# Patient Record
Sex: FEMALE | Race: WHITE | NOT HISPANIC OR LATINO | URBAN - METROPOLITAN AREA
[De-identification: names, ages, dates, MRNs, and addresses within clinical notes are randomized per-mention and may not be internally consistent; named-entity substitution may affect disease eponyms.]

---

## 2017-05-05 ENCOUNTER — EMERGENCY (EMERGENCY)
Facility: HOSPITAL | Age: 57
LOS: 1 days | Discharge: HOME | End: 2017-05-05
Attending: EMERGENCY MEDICINE | Admitting: EMERGENCY MEDICINE
Payer: MEDICARE

## 2017-05-05 VITALS
OXYGEN SATURATION: 98 % | SYSTOLIC BLOOD PRESSURE: 146 MMHG | TEMPERATURE: 100 F | DIASTOLIC BLOOD PRESSURE: 74 MMHG | HEART RATE: 85 BPM | RESPIRATION RATE: 18 BRPM

## 2017-05-05 DIAGNOSIS — L03.115 CELLULITIS OF RIGHT LOWER LIMB: ICD-10-CM

## 2017-05-05 DIAGNOSIS — Z88.5 ALLERGY STATUS TO NARCOTIC AGENT: ICD-10-CM

## 2017-05-05 DIAGNOSIS — Z96.651 PRESENCE OF RIGHT ARTIFICIAL KNEE JOINT: ICD-10-CM

## 2017-05-05 DIAGNOSIS — Z96.652 PRESENCE OF LEFT ARTIFICIAL KNEE JOINT: ICD-10-CM

## 2017-05-05 DIAGNOSIS — M25.561 PAIN IN RIGHT KNEE: ICD-10-CM

## 2017-05-05 DIAGNOSIS — Z79.82 LONG TERM (CURRENT) USE OF ASPIRIN: ICD-10-CM

## 2017-05-05 DIAGNOSIS — Z96.653 PRESENCE OF ARTIFICIAL KNEE JOINT, BILATERAL: Chronic | ICD-10-CM

## 2017-05-05 DIAGNOSIS — Z79.899 OTHER LONG TERM (CURRENT) DRUG THERAPY: ICD-10-CM

## 2017-05-05 DIAGNOSIS — Z79.2 LONG TERM (CURRENT) USE OF ANTIBIOTICS: ICD-10-CM

## 2017-05-05 LAB
ANION GAP SERPL CALC-SCNC: 11 MMOL/L — SIGNIFICANT CHANGE UP (ref 5–17)
BASE EXCESS BLDV CALC-SCNC: 5.8 MMOL/L — HIGH (ref -2–2)
BASOPHILS # BLD AUTO: 0 K/UL — SIGNIFICANT CHANGE UP (ref 0–0.2)
BASOPHILS NFR BLD AUTO: 0.2 % — SIGNIFICANT CHANGE UP (ref 0–2)
BUN SERPL-MCNC: 17 MG/DL — SIGNIFICANT CHANGE UP (ref 7–23)
CA-I SERPL-SCNC: 1.16 MMOL/L — SIGNIFICANT CHANGE UP (ref 1.12–1.3)
CALCIUM SERPL-MCNC: 8.9 MG/DL — SIGNIFICANT CHANGE UP (ref 8.4–10.5)
CHLORIDE BLDV-SCNC: 105 MMOL/L — SIGNIFICANT CHANGE UP (ref 96–108)
CHLORIDE SERPL-SCNC: 103 MMOL/L — SIGNIFICANT CHANGE UP (ref 96–108)
CO2 BLDV-SCNC: 33 MMOL/L — HIGH (ref 22–30)
CO2 SERPL-SCNC: 28 MMOL/L — SIGNIFICANT CHANGE UP (ref 22–31)
CREAT SERPL-MCNC: 0.63 MG/DL — SIGNIFICANT CHANGE UP (ref 0.5–1.3)
EOSINOPHIL # BLD AUTO: 0 K/UL — SIGNIFICANT CHANGE UP (ref 0–0.5)
EOSINOPHIL NFR BLD AUTO: 0.5 % — SIGNIFICANT CHANGE UP (ref 0–6)
ERYTHROCYTE [SEDIMENTATION RATE] IN BLOOD: 80 MM/HR — HIGH (ref 0–20)
GAS PNL BLDV: 139 MMOL/L — SIGNIFICANT CHANGE UP (ref 136–145)
GAS PNL BLDV: SIGNIFICANT CHANGE UP
GAS PNL BLDV: SIGNIFICANT CHANGE UP
GLUCOSE BLDV-MCNC: 128 MG/DL — HIGH (ref 70–99)
GLUCOSE SERPL-MCNC: 135 MG/DL — HIGH (ref 70–99)
HCO3 BLDV-SCNC: 31 MMOL/L — HIGH (ref 21–29)
HCT VFR BLD CALC: 39 % — SIGNIFICANT CHANGE UP (ref 34.5–45)
HCT VFR BLDA CALC: 42 % — SIGNIFICANT CHANGE UP (ref 39–50)
HGB BLD CALC-MCNC: 13.6 G/DL — SIGNIFICANT CHANGE UP (ref 11.5–15.5)
HGB BLD-MCNC: 13.3 G/DL — SIGNIFICANT CHANGE UP (ref 11.5–15.5)
LACTATE BLDV-MCNC: 0.9 MMOL/L — SIGNIFICANT CHANGE UP (ref 0.7–2)
LYMPHOCYTES # BLD AUTO: 1 K/UL — SIGNIFICANT CHANGE UP (ref 1–3.3)
LYMPHOCYTES # BLD AUTO: 14.4 % — SIGNIFICANT CHANGE UP (ref 13–44)
MCHC RBC-ENTMCNC: 33.5 PG — SIGNIFICANT CHANGE UP (ref 27–34)
MCHC RBC-ENTMCNC: 34.2 GM/DL — SIGNIFICANT CHANGE UP (ref 32–36)
MCV RBC AUTO: 98 FL — SIGNIFICANT CHANGE UP (ref 80–100)
MONOCYTES # BLD AUTO: 0.7 K/UL — SIGNIFICANT CHANGE UP (ref 0–0.9)
MONOCYTES NFR BLD AUTO: 10.6 % — SIGNIFICANT CHANGE UP (ref 2–14)
NEUTROPHILS # BLD AUTO: 5.2 K/UL — SIGNIFICANT CHANGE UP (ref 1.8–7.4)
NEUTROPHILS NFR BLD AUTO: 74.3 % — SIGNIFICANT CHANGE UP (ref 43–77)
PCO2 BLDV: 50 MMHG — SIGNIFICANT CHANGE UP (ref 35–50)
PH BLDV: 7.41 — SIGNIFICANT CHANGE UP (ref 7.35–7.45)
PLATELET # BLD AUTO: 249 K/UL — SIGNIFICANT CHANGE UP (ref 150–400)
PO2 BLDV: 31 MMHG — SIGNIFICANT CHANGE UP (ref 25–45)
POTASSIUM BLDV-SCNC: 3.7 MMOL/L — SIGNIFICANT CHANGE UP (ref 3.5–5)
POTASSIUM SERPL-MCNC: 3.9 MMOL/L — SIGNIFICANT CHANGE UP (ref 3.5–5.3)
POTASSIUM SERPL-SCNC: 3.9 MMOL/L — SIGNIFICANT CHANGE UP (ref 3.5–5.3)
RBC # BLD: 3.98 M/UL — SIGNIFICANT CHANGE UP (ref 3.8–5.2)
RBC # FLD: 11.9 % — SIGNIFICANT CHANGE UP (ref 10.3–14.5)
SAO2 % BLDV: 57 % — LOW (ref 67–88)
SODIUM SERPL-SCNC: 142 MMOL/L — SIGNIFICANT CHANGE UP (ref 135–145)
WBC # BLD: 7 K/UL — SIGNIFICANT CHANGE UP (ref 3.8–10.5)
WBC # FLD AUTO: 7 K/UL — SIGNIFICANT CHANGE UP (ref 3.8–10.5)

## 2017-05-05 PROCEDURE — 99218: CPT

## 2017-05-05 PROCEDURE — 73560 X-RAY EXAM OF KNEE 1 OR 2: CPT | Mod: 26,RT

## 2017-05-05 RX ORDER — CEFAZOLIN SODIUM 1 G
1000 VIAL (EA) INJECTION ONCE
Qty: 0 | Refills: 0 | Status: COMPLETED | OUTPATIENT
Start: 2017-05-05 | End: 2017-05-05

## 2017-05-05 RX ORDER — CEFAZOLIN SODIUM 1 G
VIAL (EA) INJECTION
Qty: 0 | Refills: 0 | Status: DISCONTINUED | OUTPATIENT
Start: 2017-05-05 | End: 2017-05-09

## 2017-05-05 RX ORDER — CEFAZOLIN SODIUM 1 G
1000 VIAL (EA) INJECTION EVERY 8 HOURS
Qty: 0 | Refills: 0 | Status: DISCONTINUED | OUTPATIENT
Start: 2017-05-06 | End: 2017-05-09

## 2017-05-05 RX ORDER — IBUPROFEN 200 MG
600 TABLET ORAL EVERY 6 HOURS
Qty: 0 | Refills: 0 | Status: DISCONTINUED | OUTPATIENT
Start: 2017-05-05 | End: 2017-05-09

## 2017-05-05 RX ORDER — IBUPROFEN 200 MG
600 TABLET ORAL ONCE
Qty: 0 | Refills: 0 | Status: COMPLETED | OUTPATIENT
Start: 2017-05-05 | End: 2017-05-05

## 2017-05-05 RX ADMIN — Medication 600 MILLIGRAM(S): at 20:30

## 2017-05-05 RX ADMIN — Medication 600 MILLIGRAM(S): at 21:48

## 2017-05-05 RX ADMIN — Medication 100 MILLIGRAM(S): at 21:47

## 2017-05-05 NOTE — ED PROVIDER NOTE - MEDICAL DECISION MAKING DETAILS
Lauryn (attending)- pt with warmth and erythema over right knee, full range of motion. Most likely cellulitis, less likely septic joint. Will check labs and reassess.

## 2017-05-05 NOTE — ED CDU PROVIDER NOTE - OBJECTIVE STATEMENT
57y/o F with PMH down syndrome, MR, b/l knee replacements (over 5 years ago in [facility]), arthritis, c/o knee pain x 3 days. pain and swelling getting worse. developed redness and warmth as well. states pain is worse with bending her knee. also has R wrist redness x 1 day, h/o wrist cellulitis.  PMD Dr. Ivon Contreras

## 2017-05-05 NOTE — ED PROVIDER NOTE - NS ED MD SCRIBE ATTENDING SCRIBE SECTIONS
VITAL SIGNS( Pullset)/PHYSICAL EXAM/INTAKE ASSESSMENT/SCREENINGS/HIV/HISTORY OF PRESENT ILLNESS/PAST MEDICAL/SURGICAL/SOCIAL HISTORY/REVIEW OF SYSTEMS

## 2017-05-05 NOTE — ED PROVIDER NOTE - MUSCULOSKELETAL, MLM
B/L surgical scars on knees, full active ROM, no effusion. Right wrist with cyst on posterior, full range of motion, no erythema or warmth

## 2017-05-05 NOTE — ED PROVIDER NOTE - OBJECTIVE STATEMENT
55y/o F with PMH down syndrome, MR, b/l knee replacements, arthritis, c/o knee pain x 3 days. pain and swelling getting worse. developed redness and warmth as well. states pain is worse with bending her knee. also has R wrist redness x 1 day, h/o wrist cellulitis.  PMD Dr. Ivon Contreras

## 2017-05-05 NOTE — ED CDU PROVIDER NOTE - PROGRESS NOTE DETAILS
Pts PMD: Dr. Contreras  Pt sleeping. NAD. No complaints. VSS. Continue to monitor. -ZANDER Wiley given motrin for pain. stable cellulitis will cont with current plan -ZANDER Wiley Patient seen and examined at bedside in [facility].  No acute overnight events. Patient reports that she is feeling better.  Sister at bedside reports erythema has improved.  No fevers/chills.  On exam: mild erythema without extension past previously drawn borders.  FROM of the right knee.  -Corey Haley PA-C Dr. Alonso Note: I have personally performed a face to face diagnostic evaluation on this patient.  I have reviewed the ACP note and agree with the history, exam, and plan of care, except as noted.  History and Exam by me shows well appearing, NAD, improved cellulitis of leg, stable for dc home.

## 2017-05-05 NOTE — ED ADULT NURSE REASSESSMENT NOTE - NS ED NURSE REASSESS COMMENT FT1
Received pt from [facility]SC[facility] Dawkins, (midway) received pt alert and responsive, oriented x4, denies any respiratory distress, SOB, or difficulty breathing. Pt transferred to CDU for observation for redness, swelling to R knee and R wrist redness. Pt to receive IV ancef q8h as ordered, IV in place, patent and free of signs of infiltration,  V/S stable, pt afebrile, pt denies pain at this time. Pt and mother of pt educated on unit and unit rules, instructed patient to notify RN of any needed assistance, Pt verbalizes understanding, Call bell placed within reach. Safety maintained. Will continue to monitor.

## 2017-05-05 NOTE — ED CDU PROVIDER NOTE - PLAN OF CARE
1. Follow up with your PMD within 48-72hours.   2. Rest and elevate affected area. Take Motrin 600mg every 8 hours with food for pain. Complete the course of antibiotics as prescribed.   3. Any worsening redness, swelling, streaking (red lines), fever, chills retrun to ER 1. Follow up with your PMD within 48-72hours.   2. Rest and elevate affected area. Take Motrin 600mg every 8 hours with food for pain. Complete the course of antibiotics as prescribed.   3. Any worsening redness, swelling, streaking (red lines), fever, chills return to ER

## 2017-05-05 NOTE — ED CDU PROVIDER NOTE - CONDUCTED A DETAILED DISCUSSION WITH PATIENT AND/OR GUARDIAN REGARDING, MDM
lab results/radiology results radiology results/return to ED if symptoms worsen, persist or questions arise/need for outpatient follow-up/lab results

## 2017-05-05 NOTE — ED ADULT NURSE NOTE - OBJECTIVE STATEMENT
pt to room 19 accompanied by family with c/o r knee reddness warmth for 1day. pt denies trama. pt s/p r knee replacement 5 yearago  for osteoarthritis. pt also with reddness of r wrist. [facility] PT DEVELOPS REDDNESS OF JOINTS.  R KNEE RED WARM TENDER WITHFLEXTION AND EXTENSION. PT ABLE TO WALK REDDNESS ONKNEE AND SIDES. PT DENIES FEVER / CHILLS. SIDE rails up for saftey.

## 2017-05-06 VITALS
SYSTOLIC BLOOD PRESSURE: 134 MMHG | DIASTOLIC BLOOD PRESSURE: 80 MMHG | RESPIRATION RATE: 17 BRPM | OXYGEN SATURATION: 99 % | TEMPERATURE: 99 F | HEART RATE: 69 BPM

## 2017-05-06 LAB — CRP SERPL-MCNC: 16.96 MG/DL — HIGH (ref 0–0.4)

## 2017-05-06 PROCEDURE — 85027 COMPLETE CBC AUTOMATED: CPT

## 2017-05-06 PROCEDURE — 84132 ASSAY OF SERUM POTASSIUM: CPT

## 2017-05-06 PROCEDURE — 99284 EMERGENCY DEPT VISIT MOD MDM: CPT | Mod: 25

## 2017-05-06 PROCEDURE — 82803 BLOOD GASES ANY COMBINATION: CPT

## 2017-05-06 PROCEDURE — 99217: CPT

## 2017-05-06 PROCEDURE — 86140 C-REACTIVE PROTEIN: CPT

## 2017-05-06 PROCEDURE — 83605 ASSAY OF LACTIC ACID: CPT

## 2017-05-06 PROCEDURE — G0378: CPT

## 2017-05-06 PROCEDURE — 96374 THER/PROPH/DIAG INJ IV PUSH: CPT

## 2017-05-06 PROCEDURE — 85014 HEMATOCRIT: CPT

## 2017-05-06 PROCEDURE — 80048 BASIC METABOLIC PNL TOTAL CA: CPT

## 2017-05-06 PROCEDURE — 96376 TX/PRO/DX INJ SAME DRUG ADON: CPT

## 2017-05-06 PROCEDURE — 82435 ASSAY OF BLOOD CHLORIDE: CPT

## 2017-05-06 PROCEDURE — 82947 ASSAY GLUCOSE BLOOD QUANT: CPT

## 2017-05-06 PROCEDURE — 73560 X-RAY EXAM OF KNEE 1 OR 2: CPT

## 2017-05-06 PROCEDURE — 84295 ASSAY OF SERUM SODIUM: CPT

## 2017-05-06 PROCEDURE — 82330 ASSAY OF CALCIUM: CPT

## 2017-05-06 PROCEDURE — 85652 RBC SED RATE AUTOMATED: CPT

## 2017-05-06 RX ORDER — CEPHALEXIN 500 MG
1 CAPSULE ORAL
Qty: 28 | Refills: 0 | OUTPATIENT
Start: 2017-05-06 | End: 2017-05-13

## 2017-05-06 RX ADMIN — Medication 100 MILLIGRAM(S): at 05:21

## 2017-05-06 RX ADMIN — Medication 600 MILLIGRAM(S): at 04:14

## 2017-05-06 RX ADMIN — Medication 100 MILLIGRAM(S): at 12:23

## 2017-05-06 RX ADMIN — Medication 600 MILLIGRAM(S): at 05:17

## 2017-07-27 ENCOUNTER — NON-APPOINTMENT (OUTPATIENT)
Age: 57
End: 2017-07-27

## 2017-07-27 ENCOUNTER — APPOINTMENT (OUTPATIENT)
Dept: CARDIOLOGY | Facility: CLINIC | Age: 57
End: 2017-07-27
Payer: MEDICARE

## 2017-07-27 VITALS
OXYGEN SATURATION: 95 % | HEIGHT: 59 IN | SYSTOLIC BLOOD PRESSURE: 126 MMHG | BODY MASS INDEX: 25.4 KG/M2 | DIASTOLIC BLOOD PRESSURE: 85 MMHG | WEIGHT: 126 LBS | HEART RATE: 82 BPM

## 2017-07-27 PROCEDURE — 93000 ELECTROCARDIOGRAM COMPLETE: CPT

## 2017-07-27 PROCEDURE — 99214 OFFICE O/P EST MOD 30 MIN: CPT

## 2018-01-09 ENCOUNTER — NON-APPOINTMENT (OUTPATIENT)
Age: 58
End: 2018-01-09

## 2018-01-09 ENCOUNTER — APPOINTMENT (OUTPATIENT)
Dept: CARDIOLOGY | Facility: CLINIC | Age: 58
End: 2018-01-09
Payer: MEDICARE

## 2018-01-09 VITALS
BODY MASS INDEX: 24.8 KG/M2 | WEIGHT: 123 LBS | SYSTOLIC BLOOD PRESSURE: 130 MMHG | DIASTOLIC BLOOD PRESSURE: 85 MMHG | HEIGHT: 59 IN | HEART RATE: 84 BPM | OXYGEN SATURATION: 99 %

## 2018-01-09 PROCEDURE — 93000 ELECTROCARDIOGRAM COMPLETE: CPT

## 2018-01-09 PROCEDURE — 99214 OFFICE O/P EST MOD 30 MIN: CPT

## 2018-07-12 ENCOUNTER — NON-APPOINTMENT (OUTPATIENT)
Age: 58
End: 2018-07-12

## 2018-07-12 ENCOUNTER — APPOINTMENT (OUTPATIENT)
Dept: CARDIOLOGY | Facility: CLINIC | Age: 58
End: 2018-07-12
Payer: MEDICARE

## 2018-07-12 VITALS
SYSTOLIC BLOOD PRESSURE: 118 MMHG | DIASTOLIC BLOOD PRESSURE: 70 MMHG | HEART RATE: 64 BPM | HEIGHT: 59 IN | WEIGHT: 123 LBS | OXYGEN SATURATION: 99 % | BODY MASS INDEX: 24.8 KG/M2

## 2018-07-12 PROCEDURE — 99214 OFFICE O/P EST MOD 30 MIN: CPT

## 2018-07-12 PROCEDURE — 93000 ELECTROCARDIOGRAM COMPLETE: CPT

## 2018-07-12 RX ORDER — AMOXICILLIN 500 MG/1
500 TABLET, FILM COATED ORAL
Qty: 8 | Refills: 3 | Status: ACTIVE | COMMUNITY
Start: 2018-07-12

## 2019-01-17 ENCOUNTER — APPOINTMENT (OUTPATIENT)
Dept: CARDIOLOGY | Facility: CLINIC | Age: 59
End: 2019-01-17
Payer: MEDICARE

## 2019-01-17 ENCOUNTER — NON-APPOINTMENT (OUTPATIENT)
Age: 59
End: 2019-01-17

## 2019-01-17 VITALS — DIASTOLIC BLOOD PRESSURE: 80 MMHG | SYSTOLIC BLOOD PRESSURE: 140 MMHG

## 2019-01-17 VITALS
HEART RATE: 60 BPM | HEIGHT: 59 IN | SYSTOLIC BLOOD PRESSURE: 160 MMHG | DIASTOLIC BLOOD PRESSURE: 80 MMHG | BODY MASS INDEX: 25 KG/M2 | OXYGEN SATURATION: 97 % | WEIGHT: 124 LBS

## 2019-01-17 PROCEDURE — 93000 ELECTROCARDIOGRAM COMPLETE: CPT

## 2019-01-17 PROCEDURE — 99214 OFFICE O/P EST MOD 30 MIN: CPT

## 2019-01-17 RX ORDER — FAMOTIDINE 20 MG/1
20 TABLET, FILM COATED ORAL
Refills: 0 | Status: ACTIVE | COMMUNITY

## 2019-01-17 NOTE — DISCUSSION/SUMMARY
[___ Week(s)] : [unfilled] week(s) [FreeTextEntry3] : echo [FreeTextEntry1] : She'll stay on her aspirin 81 mg daily for cardiovascular risk reduction. I ordered an echocardiogram to assess her current LV function and valvular status. Again, I recommended a heart healthy lifestyle including a low-saturated fat, low cholesterol diet with improved aerobic physical fitness over time for cardiovascular benefits. We will call her with echo report and she will follow up with you for care.

## 2019-01-17 NOTE — PHYSICAL EXAM
[General Appearance - Well Developed] : well developed [Well Groomed] : well groomed [General Appearance - Well Nourished] : well nourished [General Appearance - In No Acute Distress] : no acute distress [Normal Conjunctiva] : the conjunctiva exhibited no abnormalities [Eyelids - No Xanthelasma] : the eyelids demonstrated no xanthelasmas [Normal Oral Mucosa] : normal oral mucosa [No Oral Pallor] : no oral pallor [No Oral Cyanosis] : no oral cyanosis [Normal Jugular Venous A Waves Present] : normal jugular venous A waves present [Normal Jugular Venous V Waves Present] : normal jugular venous V waves present [No Jugular Venous Leon A Waves] : no jugular venous leon A waves [Respiration, Rhythm And Depth] : normal respiratory rhythm and effort [Exaggerated Use Of Accessory Muscles For Inspiration] : no accessory muscle use [Auscultation Breath Sounds / Voice Sounds] : lungs were clear to auscultation bilaterally [Bowel Sounds] : normal bowel sounds [Abdomen Soft] : soft [Abdomen Tenderness] : non-tender [Abnormal Walk] : normal gait [Nail Clubbing] : no clubbing of the fingernails [Cyanosis, Localized] : no localized cyanosis [Petechial Hemorrhages (___cm)] : no petechial hemorrhages [Skin Color & Pigmentation] : normal skin color and pigmentation [] : no rash [No Venous Stasis] : no venous stasis [Oriented To Time, Place, And Person] : oriented to person, place, and time [Affect] : the affect was normal [Mood] : the mood was normal [No Anxiety] : not feeling anxious [Not Palpable] : not palpable [No Precordial Heave] : no precordial heave was noted [Normal Rate] : normal [Heart Rate ___] : [unfilled] bpm [Rhythm Regular] : regular [Normal S1] : normal S1 [Normal S2] : normal S2 [No Gallop] : no gallop heard [No Murmur] : no murmurs heard [1+] : left 1+ [2+] : left 2+ [No Abnormalities] : the abdominal aorta was not enlarged and no bruit was heard [___ +] : bilateral [unfilled]U+ pitting edema to the ankles [FreeTextEntry1] : Down's syndrome facies [Apical Thrill] : no thrill palpable at the apex [Click] : no click [Pericardial Rub] : no pericardial rub [Right Carotid Bruit] : no bruit heard over the right carotid [Left Carotid Bruit] : no bruit heard over the left carotid [Bruit] : no bruit heard [Rt] : no varicose veins of the right leg [Lt] : no varicose veins of the left leg

## 2019-01-17 NOTE — REASON FOR VISIT
[Follow-Up - Clinic] : a clinic follow-up of [Abnormal ECG] : an abnormal ECG [Parent] : parent [FreeTextEntry1] : Labile hypertension

## 2019-01-17 NOTE — HISTORY OF PRESENT ILLNESS
[FreeTextEntry1] : Cande is 58 years of age with developmental delay and Down syndrome with labile hypertension without any further symptoms of near-syncope or dizziness. She is ambulatory here today with her mother. No current chest complaints. She is eating generally healthy

## 2019-01-24 ENCOUNTER — APPOINTMENT (OUTPATIENT)
Dept: CARDIOLOGY | Facility: CLINIC | Age: 59
End: 2019-01-24
Payer: MEDICARE

## 2019-01-24 PROCEDURE — 93306 TTE W/DOPPLER COMPLETE: CPT

## 2019-01-25 ENCOUNTER — TRANSCRIPTION ENCOUNTER (OUTPATIENT)
Age: 59
End: 2019-01-25

## 2019-07-18 ENCOUNTER — TRANSCRIPTION ENCOUNTER (OUTPATIENT)
Age: 59
End: 2019-07-18

## 2019-07-18 ENCOUNTER — APPOINTMENT (OUTPATIENT)
Dept: CARDIOLOGY | Facility: CLINIC | Age: 59
End: 2019-07-18
Payer: MEDICARE

## 2019-07-18 ENCOUNTER — NON-APPOINTMENT (OUTPATIENT)
Age: 59
End: 2019-07-18

## 2019-07-18 VITALS
OXYGEN SATURATION: 96 % | BODY MASS INDEX: 25 KG/M2 | WEIGHT: 124 LBS | SYSTOLIC BLOOD PRESSURE: 140 MMHG | HEART RATE: 54 BPM | DIASTOLIC BLOOD PRESSURE: 80 MMHG | HEIGHT: 59 IN

## 2019-07-18 PROCEDURE — 99214 OFFICE O/P EST MOD 30 MIN: CPT

## 2019-07-18 PROCEDURE — 93000 ELECTROCARDIOGRAM COMPLETE: CPT

## 2019-07-18 NOTE — HISTORY OF PRESENT ILLNESS
[FreeTextEntry1] : Cande is 59 years of age with Down syndrome, developmental delay residing in her group home with history of labile hypertension but no active symptoms feeling generally fine. Last echo showed preserved LV systolic function and evidence of mild to moderate mitral insufficiency. I understand she recently had cold guard positive Hemoccult stool and had colonoscopy status post polypectomy. No current chest discomfort. No current chest complaints. She is eating healthy and participating in exercises without lifestyle limiting symptoms.

## 2019-07-18 NOTE — DISCUSSION/SUMMARY
[___ Month(s)] : [unfilled] month(s) [FreeTextEntry1] : She will continue on her background supportive care medications and at this point given her recent clinical history, will stop aspirin in favor of intermittent pain relieving low-dose NSAID use and GI care management. Aspirin has fallen out of favor of primary prevention without compelling comorbid cardiovascular risk factors. She will followup with you for care and see me in about 6 months or sooner if needed. Labs are reviewed. ECG and echo were also reviewed.

## 2020-01-23 ENCOUNTER — APPOINTMENT (OUTPATIENT)
Dept: CARDIOLOGY | Facility: CLINIC | Age: 60
End: 2020-01-23
Payer: MEDICARE

## 2020-01-23 VITALS
BODY MASS INDEX: 24.6 KG/M2 | HEART RATE: 83 BPM | SYSTOLIC BLOOD PRESSURE: 142 MMHG | HEIGHT: 59 IN | OXYGEN SATURATION: 98 % | DIASTOLIC BLOOD PRESSURE: 80 MMHG | WEIGHT: 122 LBS

## 2020-01-23 DIAGNOSIS — R42 DIZZINESS AND GIDDINESS: ICD-10-CM

## 2020-01-23 PROCEDURE — 93000 ELECTROCARDIOGRAM COMPLETE: CPT

## 2020-01-23 PROCEDURE — 99214 OFFICE O/P EST MOD 30 MIN: CPT

## 2020-01-28 ENCOUNTER — OTHER (OUTPATIENT)
Age: 60
End: 2020-01-28

## 2020-02-07 ENCOUNTER — APPOINTMENT (OUTPATIENT)
Dept: ELECTROPHYSIOLOGY | Facility: CLINIC | Age: 60
End: 2020-02-07
Payer: MEDICARE

## 2020-02-07 VITALS
SYSTOLIC BLOOD PRESSURE: 164 MMHG | WEIGHT: 122 LBS | OXYGEN SATURATION: 96 % | DIASTOLIC BLOOD PRESSURE: 97 MMHG | HEIGHT: 59 IN | RESPIRATION RATE: 16 BRPM | BODY MASS INDEX: 24.6 KG/M2 | HEART RATE: 70 BPM

## 2020-02-07 DIAGNOSIS — R55 SYNCOPE AND COLLAPSE: ICD-10-CM

## 2020-02-07 PROCEDURE — 93000 ELECTROCARDIOGRAM COMPLETE: CPT

## 2020-02-07 PROCEDURE — 99203 OFFICE O/P NEW LOW 30 MIN: CPT

## 2020-02-07 NOTE — PHYSICAL EXAM
[Normal Appearance] : normal appearance [General Appearance - Well Developed] : well developed [General Appearance - Well Nourished] : well nourished [Well Groomed] : well groomed [General Appearance - In No Acute Distress] : no acute distress [No Deformities] : no deformities [Normal Conjunctiva] : the conjunctiva exhibited no abnormalities [Eyelids - No Xanthelasma] : the eyelids demonstrated no xanthelasmas [Normal Oral Mucosa] : normal oral mucosa [No Oral Pallor] : no oral pallor [No Oral Cyanosis] : no oral cyanosis [Normal Jugular Venous A Waves Present] : normal jugular venous A waves present [Normal Jugular Venous V Waves Present] : normal jugular venous V waves present [No Jugular Venous Leon A Waves] : no jugular venous leon A waves [Heart Rate And Rhythm] : heart rate and rhythm were normal [Heart Sounds] : normal S1 and S2 [Murmurs] : no murmurs present [Auscultation Breath Sounds / Voice Sounds] : lungs were clear to auscultation bilaterally [Exaggerated Use Of Accessory Muscles For Inspiration] : no accessory muscle use [Respiration, Rhythm And Depth] : normal respiratory rhythm and effort [Abdomen Tenderness] : non-tender [Abdomen Soft] : soft [Abdomen Mass (___ Cm)] : no abdominal mass palpated [Abnormal Walk] : normal gait [Gait - Sufficient For Exercise Testing] : the gait was sufficient for exercise testing [Nail Clubbing] : no clubbing of the fingernails [Cyanosis, Localized] : no localized cyanosis [Petechial Hemorrhages (___cm)] : no petechial hemorrhages [Skin Color & Pigmentation] : normal skin color and pigmentation [] : no ischemic changes [Skin Lesions] : no skin lesions [No Venous Stasis] : no venous stasis [No Skin Ulcers] : no skin ulcer [No Xanthoma] : no  xanthoma was observed [Affect] : the affect was normal [Oriented To Time, Place, And Person] : oriented to person, place, and time [Mood] : the mood was normal [No Anxiety] : not feeling anxious

## 2020-02-08 ENCOUNTER — NON-APPOINTMENT (OUTPATIENT)
Age: 60
End: 2020-02-08

## 2020-02-08 NOTE — DISCUSSION/SUMMARY
[FreeTextEntry1] : Cande Grossman is a 60y/o woman with Hx of Downs Syndrome, lives in a group home, labile HTN, hypothyroid, GERD, mild-moderate mitral insufficiency, and recurring dizziness/near syncope who presents today for initial evaluation.\par \par Impression:\par \par 1. Dizziness/vaso vagal near syncope: EKG today NSR. Given prodromal symptoms of dizziness/heat/pallor, likely vasovagal. Positive tilt test in 2016. Instructed on safety mechanisms such as staying well hydrated, utilizing salt, avoiding prolonged standing with knees locked, and to lie down with feet elevated if symptoms of near syncope occur. Can also consider use of compression socks and should avoid known triggers for syncope such as alcohol and warm or crowded environments. Given noted bradycardia in the 40s, possible cardioinhibitory response. Consider ILR placement for long term monitoring of cardioinhibitory syncope. Risks, benefits, and alternatives discussed. \par \par 2. Hypothyroid: resume levothyroxine as prescribed and regular f/u with PCP for routine TFT monitoring and management.\par \par 3. HTN: resume BP monitoring and keeping a log. Encouraged heart healthy diet, sodium restriction, and weight loss. Continue regular f/u with Cardiologist for further HTN management.\par \par Will continue f/u with Cardiologist and may RTO as needed or if any new or worsening symptoms or findings occur.\par \par Sincerely,\par \par Timothy Quintana MD

## 2020-02-08 NOTE — HISTORY OF PRESENT ILLNESS
[FreeTextEntry1] : Oj Arias MD\par \par Cande Grossman is a 60y/o woman with Hx of Downs Syndrome, lives in a group home, labile HTN, hypothyroid, GERD, mild-moderate mitral insufficiency, and recurring dizziness/near syncope who presents today for initial evaluation. Has long standing history of dizziness, typically in the morning with symptoms of heat and noted pallor. Was seen by EP and underwent tilt table test which was positive (2016). Since that time, she had no further episodes until recently. Most recent, had an episode of dizziness in the evening with sensation of her eyes quivering. Noted feeling hot at that time as well. No syncope or LOC. Her BP was normal at the time but her heart rate was in the 40s during that time. This is similar to event sin the past where her heart rate was transiently in the 40s during symptoms. Has had no syncope or near syncope. Was started on low dose bisoprolol which has been held in the setting of bradycardia at times. No dizziness with use but only took two days before noting bradycardia. Prior episodes without use of AV nodals. Has known labile BP. Vital signs are monitored by Group Home closely. Denies chest pain, palpitations, or SOB.\par

## 2020-02-27 ENCOUNTER — APPOINTMENT (OUTPATIENT)
Dept: CARDIOLOGY | Facility: CLINIC | Age: 60
End: 2020-02-27
Payer: MEDICARE

## 2020-02-27 VITALS
BODY MASS INDEX: 24.8 KG/M2 | WEIGHT: 123 LBS | HEART RATE: 80 BPM | DIASTOLIC BLOOD PRESSURE: 80 MMHG | SYSTOLIC BLOOD PRESSURE: 140 MMHG | OXYGEN SATURATION: 98 % | HEIGHT: 59 IN

## 2020-02-27 PROCEDURE — 99214 OFFICE O/P EST MOD 30 MIN: CPT

## 2020-02-27 RX ORDER — BISOPROLOL FUMARATE 5 MG/1
5 TABLET, FILM COATED ORAL
Qty: 60 | Refills: 0 | Status: DISCONTINUED | COMMUNITY
Start: 2020-01-23 | End: 2020-02-27

## 2020-02-27 NOTE — HISTORY OF PRESENT ILLNESS
[FreeTextEntry1] : Cande is a pleasant 59-year-old female with developmental delay, Down syndrome, labile hypertension and mild to moderate mitral regurgitation following up in the office. I reviewed recent data from EP evaluation advising conservative care management. Intolerance to bisoprolol noted as led to further bradycardia.

## 2020-02-27 NOTE — PHYSICAL EXAM
[General Appearance - Well Developed] : well developed [Well Groomed] : well groomed [General Appearance - Well Nourished] : well nourished [General Appearance - In No Acute Distress] : no acute distress [Normal Conjunctiva] : the conjunctiva exhibited no abnormalities [Eyelids - No Xanthelasma] : the eyelids demonstrated no xanthelasmas [Normal Oral Mucosa] : normal oral mucosa [No Oral Pallor] : no oral pallor [No Oral Cyanosis] : no oral cyanosis [Normal Jugular Venous A Waves Present] : normal jugular venous A waves present [Normal Jugular Venous V Waves Present] : normal jugular venous V waves present [No Jugular Venous Leon A Waves] : no jugular venous leon A waves [Respiration, Rhythm And Depth] : normal respiratory rhythm and effort [Exaggerated Use Of Accessory Muscles For Inspiration] : no accessory muscle use [Bowel Sounds] : normal bowel sounds [Auscultation Breath Sounds / Voice Sounds] : lungs were clear to auscultation bilaterally [Abdomen Tenderness] : non-tender [Abdomen Soft] : soft [Abnormal Walk] : normal gait [Nail Clubbing] : no clubbing of the fingernails [Cyanosis, Localized] : no localized cyanosis [Petechial Hemorrhages (___cm)] : no petechial hemorrhages [Skin Color & Pigmentation] : normal skin color and pigmentation [No Venous Stasis] : no venous stasis [] : no rash [Oriented To Time, Place, And Person] : oriented to person, place, and time [Affect] : the affect was normal [Mood] : the mood was normal [No Anxiety] : not feeling anxious [Not Palpable] : not palpable [No Precordial Heave] : no precordial heave was noted [Normal Rate] : normal [Heart Rate ___] : [unfilled] bpm [Rhythm Regular] : regular [Normal S1] : normal S1 [Normal S2] : normal S2 [No Gallop] : no gallop heard [No Murmur] : no murmurs heard [1+] : left 1+ [2+] : right 2+ [No Abnormalities] : the abdominal aorta was not enlarged and no bruit was heard [___ +] : bilateral [unfilled]U+ pitting edema to the ankles [FreeTextEntry1] : Down's syndrome facies [Apical Thrill] : no thrill palpable at the apex [Pericardial Rub] : no pericardial rub [Click] : no click [Right Carotid Bruit] : no bruit heard over the right carotid [Left Carotid Bruit] : no bruit heard over the left carotid [Bruit] : no bruit heard [Rt] : no varicose veins of the right leg [Lt] : no varicose veins of the left leg

## 2020-02-27 NOTE — DISCUSSION/SUMMARY
[___ Month(s)] : [unfilled] month(s) [FreeTextEntry1] : She'll continue conservative management including compression socks as well as more salt and fluid in her diet. We'll avoid antihypertensives for now. Consideration down the road for ILR but we'll hold off for now.I recommended a heart healthy lifestyle including a low-saturated fat, low cholesterol diet with improved aerobic physical fitness over time for cardiovascular benefits. Followup in 6 months or sooner if needed.

## 2020-03-26 NOTE — CARDIOLOGY SUMMARY
Spoke with pt and scheduled appt. Date/time/location confirmed. Verbalized understanding.   
[___] : [unfilled]

## 2020-03-31 ENCOUNTER — NON-APPOINTMENT (OUTPATIENT)
Age: 60
End: 2020-03-31

## 2020-03-31 PROBLEM — R42 DIZZINESS: Status: ACTIVE | Noted: 2020-02-07

## 2020-03-31 NOTE — PHYSICAL EXAM
[General Appearance - Well Developed] : well developed [Well Groomed] : well groomed [General Appearance - Well Nourished] : well nourished [General Appearance - In No Acute Distress] : no acute distress [Normal Conjunctiva] : the conjunctiva exhibited no abnormalities [Eyelids - No Xanthelasma] : the eyelids demonstrated no xanthelasmas [Normal Oral Mucosa] : normal oral mucosa [No Oral Pallor] : no oral pallor [No Oral Cyanosis] : no oral cyanosis [Normal Jugular Venous A Waves Present] : normal jugular venous A waves present [Normal Jugular Venous V Waves Present] : normal jugular venous V waves present [No Jugular Venous Leon A Waves] : no jugular venous leon A waves [Respiration, Rhythm And Depth] : normal respiratory rhythm and effort [Exaggerated Use Of Accessory Muscles For Inspiration] : no accessory muscle use [Auscultation Breath Sounds / Voice Sounds] : lungs were clear to auscultation bilaterally [Bowel Sounds] : normal bowel sounds [Abdomen Soft] : soft [Abdomen Tenderness] : non-tender [Abnormal Walk] : normal gait [Nail Clubbing] : no clubbing of the fingernails [Cyanosis, Localized] : no localized cyanosis [Petechial Hemorrhages (___cm)] : no petechial hemorrhages [Skin Color & Pigmentation] : normal skin color and pigmentation [] : no rash [No Venous Stasis] : no venous stasis [Oriented To Time, Place, And Person] : oriented to person, place, and time [Affect] : the affect was normal [Mood] : the mood was normal [No Anxiety] : not feeling anxious [Not Palpable] : not palpable [No Precordial Heave] : no precordial heave was noted [Normal Rate] : normal [Heart Rate ___] : [unfilled] bpm [Rhythm Regular] : regular [Normal S1] : normal S1 [Normal S2] : normal S2 [No Gallop] : no gallop heard [No Murmur] : no murmurs heard [1+] : left 1+ [No Abnormalities] : the abdominal aorta was not enlarged and no bruit was heard [___ +] : bilateral [unfilled]U+ pitting edema to the ankles [FreeTextEntry1] : Down's syndrome facies [Apical Thrill] : no thrill palpable at the apex [Click] : no click [Pericardial Rub] : no pericardial rub [Right Carotid Bruit] : no bruit heard over the right carotid [Left Carotid Bruit] : no bruit heard over the left carotid [Bruit] : no bruit heard [Rt] : no varicose veins of the right leg [Lt] : no varicose veins of the left leg

## 2020-03-31 NOTE — HISTORY OF PRESENT ILLNESS
[FreeTextEntry1] : Cande is 59 years of age with developmental delay, Down syndrome, lightheadedness and labile hypertension. Following up in the office today with her mother. No current chest pains noted.

## 2020-03-31 NOTE — DISCUSSION/SUMMARY
[___ Month(s)] : [unfilled] month(s) [FreeTextEntry1] : We talked about pursuing EP evaluation for possible ILR given history of positive tilt table study and bradycardia. Further details to follow based on this evaluation. Stay on her background supportive care medications. I reviewed her prior available cardiac and lab data as well as blood pressure readings. I recommended a heart healthy lifestyle including a low-saturated fat, low cholesterol diet with improved aerobic physical fitness over time for cardiovascular benefits. See me in one month or sooner if needed.

## 2020-07-09 ENCOUNTER — NON-APPOINTMENT (OUTPATIENT)
Age: 60
End: 2020-07-09

## 2020-07-09 ENCOUNTER — APPOINTMENT (OUTPATIENT)
Dept: CARDIOLOGY | Facility: CLINIC | Age: 60
End: 2020-07-09
Payer: MEDICARE

## 2020-07-09 VITALS
OXYGEN SATURATION: 98 % | DIASTOLIC BLOOD PRESSURE: 84 MMHG | WEIGHT: 116 LBS | BODY MASS INDEX: 23.39 KG/M2 | HEIGHT: 59 IN | SYSTOLIC BLOOD PRESSURE: 130 MMHG | HEART RATE: 92 BPM | TEMPERATURE: 98.4 F

## 2020-07-09 DIAGNOSIS — R55 SYNCOPE AND COLLAPSE: ICD-10-CM

## 2020-07-09 PROCEDURE — 93000 ELECTROCARDIOGRAM COMPLETE: CPT

## 2020-07-09 PROCEDURE — 99214 OFFICE O/P EST MOD 30 MIN: CPT

## 2020-07-22 ENCOUNTER — APPOINTMENT (OUTPATIENT)
Dept: CARDIOLOGY | Facility: CLINIC | Age: 60
End: 2020-07-22
Payer: MEDICARE

## 2020-07-22 PROCEDURE — 93306 TTE W/DOPPLER COMPLETE: CPT

## 2020-08-05 NOTE — DISCUSSION/SUMMARY
[___ Month(s)] : [unfilled] month(s) [FreeTextEntry1] : To further cardiac risk stratify, I have ordered an echocardiogram to assess LV function and valvular status. Monitor symptoms and follow periodic blood pressure and pulse readings at the present time. I recommended a heart healthy lifestyle including a low-saturated fat, low cholesterol diet with improved aerobic physical fitness over time for cardiovascular benefits. Followup with you for care and see me in about 3 months or sooner if needed.

## 2020-08-05 NOTE — PHYSICAL EXAM
[Well Groomed] : well groomed [General Appearance - In No Acute Distress] : no acute distress [General Appearance - Well Nourished] : well nourished [General Appearance - Well Developed] : well developed [Eyelids - No Xanthelasma] : the eyelids demonstrated no xanthelasmas [Normal Conjunctiva] : the conjunctiva exhibited no abnormalities [No Oral Pallor] : no oral pallor [Normal Oral Mucosa] : normal oral mucosa [No Oral Cyanosis] : no oral cyanosis [Normal Jugular Venous V Waves Present] : normal jugular venous V waves present [Normal Jugular Venous A Waves Present] : normal jugular venous A waves present [Respiration, Rhythm And Depth] : normal respiratory rhythm and effort [No Jugular Venous Leon A Waves] : no jugular venous leon A waves [Auscultation Breath Sounds / Voice Sounds] : lungs were clear to auscultation bilaterally [Exaggerated Use Of Accessory Muscles For Inspiration] : no accessory muscle use [Bowel Sounds] : normal bowel sounds [Abdomen Soft] : soft [Abdomen Tenderness] : non-tender [Abnormal Walk] : normal gait [Cyanosis, Localized] : no localized cyanosis [Skin Color & Pigmentation] : normal skin color and pigmentation [Nail Clubbing] : no clubbing of the fingernails [Petechial Hemorrhages (___cm)] : no petechial hemorrhages [Oriented To Time, Place, And Person] : oriented to person, place, and time [No Venous Stasis] : no venous stasis [Affect] : the affect was normal [] : no rash [Mood] : the mood was normal [Not Palpable] : not palpable [No Anxiety] : not feeling anxious [No Precordial Heave] : no precordial heave was noted [Heart Rate ___] : [unfilled] bpm [Rhythm Regular] : regular [Normal Rate] : normal [Normal S1] : normal S1 [Normal S2] : normal S2 [No Gallop] : no gallop heard [No Murmur] : no murmurs heard [No Abnormalities] : the abdominal aorta was not enlarged and no bruit was heard [1+] : left 1+ [___ +] : bilateral [unfilled]U+ pitting edema to the ankles [FreeTextEntry1] : Down's syndrome facies [Click] : no click [Apical Thrill] : no thrill palpable at the apex [Right Carotid Bruit] : no bruit heard over the right carotid [Pericardial Rub] : no pericardial rub [Left Carotid Bruit] : no bruit heard over the left carotid [Rt] : no varicose veins of the right leg [Bruit] : no bruit heard [Lt] : no varicose veins of the left leg

## 2020-08-05 NOTE — HISTORY OF PRESENT ILLNESS
[FreeTextEntry1] : Cande is 60 years of age with history of Down's syndrome and developmental delay following up in the office with labile hypertension and near syncopal spell at times. Lately she's been feeling better. No current chest complaints noted. Eating generally healthy.

## 2020-10-08 ENCOUNTER — APPOINTMENT (OUTPATIENT)
Dept: CARDIOLOGY | Facility: CLINIC | Age: 60
End: 2020-10-08
Payer: MEDICARE

## 2020-10-08 ENCOUNTER — NON-APPOINTMENT (OUTPATIENT)
Age: 60
End: 2020-10-08

## 2020-10-08 VITALS
SYSTOLIC BLOOD PRESSURE: 130 MMHG | HEART RATE: 80 BPM | BODY MASS INDEX: 22.98 KG/M2 | TEMPERATURE: 98.3 F | WEIGHT: 114 LBS | DIASTOLIC BLOOD PRESSURE: 74 MMHG | OXYGEN SATURATION: 99 % | HEIGHT: 59 IN

## 2020-10-08 PROCEDURE — 99214 OFFICE O/P EST MOD 30 MIN: CPT

## 2020-10-08 PROCEDURE — 93000 ELECTROCARDIOGRAM COMPLETE: CPT

## 2020-11-15 NOTE — HISTORY OF PRESENT ILLNESS
[FreeTextEntry1] : Cande is 60 years of age with developmental delay, Down syndrome, Hyperlipidemia, hypertension which is somewhat labile who is feeling generally well without recurrent near syncope. I reviewed available lab and echo info.

## 2020-11-15 NOTE — DISCUSSION/SUMMARY
[___ Month(s)] : [unfilled] month(s) [FreeTextEntry1] : I reviewed her echo and other available lab data.I recommended a heart healthy lifestyle including a low-saturated fat, low cholesterol diet with improved aerobic physical fitness over time for cardiovascular benefits.Carbohydrate and sodium restriction along with weight loss over time encouraged. Follow up with you and see me in 6 months or sooner if needed.

## 2021-01-01 NOTE — ED ADULT TRIAGE NOTE - CADM TRG TX PRIOR TO ARRIVAL
none
No signs of meconium exposure, Normal patterns of skin texture, integrity, pigmentation, color, vascularity, and perfusion; No rashes or eruptions.

## 2021-04-14 ENCOUNTER — NON-APPOINTMENT (OUTPATIENT)
Age: 61
End: 2021-04-14

## 2021-04-14 ENCOUNTER — APPOINTMENT (OUTPATIENT)
Dept: CARDIOLOGY | Facility: CLINIC | Age: 61
End: 2021-04-14
Payer: MEDICARE

## 2021-04-14 VITALS
HEART RATE: 80 BPM | WEIGHT: 109 LBS | SYSTOLIC BLOOD PRESSURE: 120 MMHG | BODY MASS INDEX: 21.97 KG/M2 | OXYGEN SATURATION: 96 % | DIASTOLIC BLOOD PRESSURE: 70 MMHG | HEIGHT: 59 IN | TEMPERATURE: 99.2 F

## 2021-04-14 PROCEDURE — 99213 OFFICE O/P EST LOW 20 MIN: CPT

## 2021-04-14 PROCEDURE — 93000 ELECTROCARDIOGRAM COMPLETE: CPT

## 2021-05-15 NOTE — DISCUSSION/SUMMARY
[___ Month(s)] : [unfilled] month(s) [FreeTextEntry1] : Continue with Synthroid and background supportive care vitamins. No cardiac testing indicated present. Again, I recommended a heart healthy lifestyle including a low-saturated fat, low cholesterol diet with improved aerobic physical fitness over time for cardiovascular benefits.Carbohydrate and sodium restriction along with weight loss over time encouraged. Followup with you for care and see me in 6 months or sooner if needed.

## 2021-05-15 NOTE — HISTORY OF PRESENT ILLNESS
[FreeTextEntry1] : Cande is a pleasant 60-year-old female with history of labile hypertension, Down syndrome, developmental delay, dyslipidemia and felt that the knee up is feeling generally well without current chest complaints.I reviewed available labs as well as blood pressure readings.

## 2021-09-29 ENCOUNTER — EMERGENCY (EMERGENCY)
Facility: HOSPITAL | Age: 61
LOS: 1 days | Discharge: HOME | End: 2021-09-29
Attending: STUDENT IN AN ORGANIZED HEALTH CARE EDUCATION/TRAINING PROGRAM
Payer: MEDICARE

## 2021-09-29 VITALS
DIASTOLIC BLOOD PRESSURE: 77 MMHG | RESPIRATION RATE: 18 BRPM | HEART RATE: 54 BPM | TEMPERATURE: 98 F | SYSTOLIC BLOOD PRESSURE: 146 MMHG | OXYGEN SATURATION: 98 % | WEIGHT: 110.23 LBS | HEIGHT: 59 IN

## 2021-09-29 VITALS
OXYGEN SATURATION: 98 % | DIASTOLIC BLOOD PRESSURE: 72 MMHG | SYSTOLIC BLOOD PRESSURE: 142 MMHG | HEART RATE: 61 BPM | TEMPERATURE: 98 F | RESPIRATION RATE: 18 BRPM

## 2021-09-29 DIAGNOSIS — Z96.653 PRESENCE OF ARTIFICIAL KNEE JOINT, BILATERAL: Chronic | ICD-10-CM

## 2021-09-29 PROCEDURE — 76377 3D RENDER W/INTRP POSTPROCES: CPT | Mod: 26

## 2021-09-29 PROCEDURE — 73562 X-RAY EXAM OF KNEE 3: CPT | Mod: 26,RT

## 2021-09-29 PROCEDURE — 70486 CT MAXILLOFACIAL W/O DYE: CPT | Mod: 26,MA

## 2021-09-29 PROCEDURE — 71120 X-RAY EXAM BREASTBONE 2/>VWS: CPT | Mod: 26

## 2021-09-29 PROCEDURE — 72125 CT NECK SPINE W/O DYE: CPT | Mod: 26,MA

## 2021-09-29 PROCEDURE — 73590 X-RAY EXAM OF LOWER LEG: CPT | Mod: 26,RT

## 2021-09-29 PROCEDURE — 70450 CT HEAD/BRAIN W/O DYE: CPT | Mod: 26,MA

## 2021-09-29 PROCEDURE — 99284 EMERGENCY DEPT VISIT MOD MDM: CPT

## 2021-09-29 PROCEDURE — 71046 X-RAY EXAM CHEST 2 VIEWS: CPT | Mod: 26

## 2021-09-29 RX ORDER — TETANUS TOXOID, REDUCED DIPHTHERIA TOXOID AND ACELLULAR PERTUSSIS VACCINE, ADSORBED 5; 2.5; 8; 8; 2.5 [IU]/.5ML; [IU]/.5ML; UG/.5ML; UG/.5ML; UG/.5ML
0.5 SUSPENSION INTRAMUSCULAR ONCE
Refills: 0 | Status: COMPLETED | OUTPATIENT
Start: 2021-09-29 | End: 2021-09-29

## 2021-09-29 RX ORDER — ACETAMINOPHEN 500 MG
975 TABLET ORAL ONCE
Refills: 0 | Status: COMPLETED | OUTPATIENT
Start: 2021-09-29 | End: 2021-09-29

## 2021-09-29 RX ADMIN — Medication 975 MILLIGRAM(S): at 22:02

## 2021-09-29 RX ADMIN — TETANUS TOXOID, REDUCED DIPHTHERIA TOXOID AND ACELLULAR PERTUSSIS VACCINE, ADSORBED 0.5 MILLILITER(S): 5; 2.5; 8; 8; 2.5 SUSPENSION INTRAMUSCULAR at 22:03

## 2021-09-29 NOTE — ED PROVIDER NOTE - CLINICAL SUMMARY MEDICAL DECISION MAKING FREE TEXT BOX
Attending MD Veloz : 61y F pmhx down syndrome, MR, b/l knee replacements, presents to ED [facility] from group home accompanied by aide c/o avulsion of her two front teeth, lip laceration, and R knee pain s/p mechanical trip and fall forward while wearing slippers at approx 3pm today. Given mechanical fall, doubt cardiogeni syncope. No LOC. Exam remarkable for lip lac, avulsion of the two frontal maxillary incisors central. No active bleeding. Small swelling and TTP to R knee, but patient able to ambulate normally with a cane here in ED. No complaints at this time. No midline spinal TTP. No abdominal TTP. Given patient's hx of MR, will obtain CTH and C spine, XR of knee. No evidence of trauma on body aside from knee and face. WIll c/s dental to reimplant front teeth if possible, given lack of my access to splinting supplies. Patient has no complaints at this time.

## 2021-09-29 NOTE — CONSULT NOTE ADULT - SUBJECTIVE AND OBJECTIVE BOX
Patient is a 61y old  Female who presents with a chief complaint of       Pt presents with aide who does not recall time of fall but believes it to be around noon if not earlier  #8, and #9 avulsed and in milk bath      PAST MEDICAL & SURGICAL HISTORY:  Mental retardation    Cellulitis    Down syndrome    Osteoarthritis    H/O total knee replacement, bilateral      (   ) heart valve replacement  (   ) joint replacement  (   ) pregnancy    MEDICATIONS  (STANDING):  acetaminophen   Tablet .. 975 milliGRAM(s) Oral once  diphtheria/tetanus/pertussis (acellular) Vaccine (ADAcel) 0.5 milliLiter(s) IntraMuscular once    MEDICATIONS  (PRN):      Allergies    Percocet 10/325 (Rash)  Prilosec (Rash)    Intolerances        FAMILY HISTORY:      *SOCIAL HISTORY: (guardian or who pt came with), (smoking hx)    *Last Dental Visit:    Vital Signs Last 24 Hrs  T(C): 36.8 (29 Sep 2021 18:36), Max: 36.8 (29 Sep 2021 18:36)  T(F): 98.2 (29 Sep 2021 18:36), Max: 98.2 (29 Sep 2021 18:36)  HR: 70 (29 Sep 2021 18:36) (54 - 70)  BP: 157/84 (29 Sep 2021 18:36) (146/77 - 157/84)  BP(mean): --  RR: 17 (29 Sep 2021 18:36) (17 - 18)  SpO2: 99% (29 Sep 2021 18:36) (98% - 99%)    EOE:  TMJ ( -  ) clicks                    ( -   ) pops                    (  -  ) crepitus             Mandible FROM             Facial bones and MOM grossly intact             ( -  ) trismus             ( -  ) LAD             ( -  ) swelling             (  - ) asymmetry             (  - ) palpation             (  - ) SOB             (  - ) dysphagia             (  - ) LOC    IOE:  multiple missing teeth           hard/soft palate:  ( -  ) palatal torus           tongue/FOM WNL           labial/buccal mucosa: upper lip labial laceration lateral to midline of right side           (  + ) percussion           ( -  ) palpation           ( -  ) swelling     Mobility: Grade 3 mobility #6, Grade 2: 7, 10,11    Radiographs:  Panoramic taken - fully avulsed 8 and 9 noted,   #6, 7, 10 and 11 no fracture noted   PA taken - no signs of fracture or remaining root tips      ASSESSMENT: Pt presents with aide   #8, and #9 avulsed and in milk bath   Grade 3 mobility noted on #6 - no tooth fracture noted  Grade 2 mobility noted on #7, 10, 11 - no fracture noted  #9 avulsed tooth presents with cantu class 3 fracture and fracture along root  #8 intact    Upper lip laceration was bleeding during exam - gauze and pressure used to achieve hemostasis     PROCEDURE:  Verbal and written consent given.   Attempted to reposition tooth #8 to splint together #6,7,8,10,11 - pt behavior made repositioning of tooth very difficult  #6,7,10,11, and 12 splinted together using etch, bond and flowable composite, light cure  isolation difficult to achieve due to heme and patient anxiety in dental chair (would move and try to get up throughout procedure)   Pt and aide informed splint may fall off due to mobility of teeth and if patient kept touching it     Upper right lip laceration noted -  1/4 carpule 4% septocaine 1:100,000 epinephrine locally infiltrated around upper lip   4-0 chromic gut suture placed    Pt aide informed that the pt has a dental appt monday and would get teeth re-evaluated    Spoke to pt Home aide - Meliza over phone - informed of assessment and procedure    RECOMMENDATIONS:   1) Follow up with dental clinic for splint removal  2) soft food diet only - avoid using anterior teeth for chewing  3) PO antibiotics per med team   4) Dental F/U with outpatient dentist for comprehensive dental care.     Chito Monaco, pager #[phone]

## 2021-09-29 NOTE — ED PROVIDER NOTE - NSFOLLOWUPCLINICS_GEN_ALL_ED_FT
[facility]  Dental  [address] [place] [id number]  Phone: [phone]  Fax:   Scheduled Appointment: 10/4/2021     [facility]  Dentistry  .  [place] [address]  Phone: [phone]  Fax:

## 2021-09-29 NOTE — ED ADULT NURSE NOTE - NSIMPLEMENTINTERV_GEN_ALL_ED
Implemented All Fall Risk Interventions:  [place] to call system. Call bell, personal items and telephone within reach. Instruct patient to call for assistance. Room bathroom lighting operational. Non-slip footwear when patient is off stretcher. Physically safe environment: no spills, clutter or unnecessary equipment. Stretcher in lowest position, wheels locked, appropriate side rails in place. Provide visual cue, wrist band, yellow gown, etc. Monitor gait and stability. Monitor for mental status changes and reorient to person, place, and time. Review medications for side effects contributing to fall risk. Reinforce activity limits and safety measures with patient and family.

## 2021-09-29 NOTE — ED ADULT NURSE NOTE - OBJECTIVE STATEMENT
60 y/o female coming in via EMS from group home facility s/p fall. AOx4, ambulatory at baseline with cane, PMH MR, total knee replacements, hypothyroidism, chronic back pain. Pt. reports was wearing slippers ambulating with her cane and slipped and fell onto her face. Pt. knocked out two teeth (in container in milk) and has upper lip laceration noted. Denies any other complaints. As per EMS, patient has had bilateral knee replacements and the facility wanted her to get her knees checked out. Pt. was not able to ambulate independently after the fall. SPontaneous and unlabored respirations noted. Pt. is otherwise well appearing. Will continue to reassess.

## 2021-09-29 NOTE — ED PROVIDER NOTE - PROGRESS NOTE DETAILS
Barrington Shelton PA-C: Pt with dental Barrington Shelton PA-C: s/w dental - will see pt in ED shortly Octavio Tee, PGY-2- Patient evaluated in ED by dental team, unable to reattach teeth, but had splinting and laceration repair. Pt to f/u in clinic Monday, get PO abx. Will dc home. Pt to go home with aid from group home. Family aware of plan. Discussed results of work up with patient. Patient agrees with plan to discharge home. All questions answered regarding plan. Strict return precautions given.

## 2021-09-29 NOTE — ED ADULT TRIAGE NOTE - CHIEF COMPLAINT QUOTE
fall forward, hit face on tile floor, 2 front teeth knocked out, lip lac, no loc or thinners  diff ambulating afterwards possibly due to knee pain

## 2021-09-29 NOTE — ED PROVIDER NOTE - NSFOLLOWUPINSTRUCTIONS_ED_ALL_ED_FT
1) Follow up with dentist on Monday   2) Return to the ED immediately for new or worsening symptoms severe pain, fever, unable to eat/drink (only eat soft foods), not improving   3) Please continue to take any home medications as prescribed  4) Your test results from your ED visit were discussed with you prior to discharge  5) You were provided with a copy of your test results  6) Follow up with dental clinic for splint removal  7) soft food diet only - avoid using anterior teeth for chewing  8) Take all antibiotics as prescribed. Please follow all pharmacy packaging instructions and warnings.   9) Dental F/U with outpatient dentist for comprehensive dental care.

## 2021-09-29 NOTE — ED PROVIDER NOTE - OBJECTIVE STATEMENT
61y F pmhx down syndrome, MR, b/l knee replacements, presents to ED L[facility] from group home accompanied by aide c/o avulsion of her two front teeth, lip laceration, and R knee pain s/p mechanical trip and fall forward while wearing slippers at approx 3pm today. Witnessed by nurse. Ambulates at baseline with cane. No reported loc, AC use. 61y F pmhx down syndrome, MR, b/l knee replacements, presents to ED LIA from group home accompanied by aide c/o avulsion of her two front teeth, lip laceration, and R knee pain s/p mechanical trip and fall forward while wearing slippers at approx 3pm today. Witnessed by nurse. Ambulates at baseline with cane. No reported loc, AC use. Paitient states that she tripped over her own feet. Able to ambulate without difficulty at this time.

## 2021-09-29 NOTE — ED PROVIDER NOTE - PHYSICAL EXAMINATION
GEN: Pt non-toxic in NAD, MR, A&Ox3.  PSYCH: Affect and mood appropriate.  EYES: Sclera white w/o injection, EOMI, PERRLA.   HENTM: +avulsion of both front teeth, insertion sites w/ clot, no visible tooth remanence. +R upper lip curvilinear lac ~1cm. No renee sign. Neck supple FROM. Airway patent.  RESP: Lower sternum ttp, no crepitus or flail. CTA b/l, no wheezes, rales, or rhonchi.   CARDIAC: RRR, clear distinct S1, S2, no appreciable murmurs.  ABD: Abdomen soft, non-tender. No CVAT b/l.  MSK: No joint erythema or obvious deformity. +distal knee ttp, FROM w/ discomfort, able to bear weight. No obvious spinal deformity, no midline spinal ttp, no step-offs. FROM b/l UE and LE.  NEURO: No focal motor or sensory deficits.  VASC: Radial and dorsalis pedis pulses 2+ b/l. No edema or calf tenderness.  SKIN: See HEMNTM above. No rashes or lesions. GEN: Pt non-toxic in NAD, MR, A&Ox3.  PSYCH: Affect and mood appropriate.  EYES: Sclera white w/o injection, EOMI, PERRLA.   HENTM: +avulsion of both front teeth, insertion sites w/ clot, no visible tooth remanents +R upper lip curvilinear lac ~1cm. No renee sign. Neck supple FROM. Airway patent.  RESP: Lower sternum ttp, no crepitus or flail. CTA b/l, no wheezes, rales, or rhonchi.   CARDIAC: RRR, clear distinct S1, S2, no appreciable murmurs.  ABD: Abdomen soft, non-tender. No CVAT b/l.  MSK: No joint erythema or obvious deformity. +distal knee ttp, FROM w/ discomfort, able to bear weight. No obvious spinal deformity, no midline spinal ttp, no step-offs. FROM b/l UE and LE.  NEURO: No focal motor or sensory deficits.  VASC: Radial and dorsalis pedis pulses 2+ b/l. No edema or calf tenderness.  SKIN: See HEMNTM above. No rashes or lesions.

## 2021-09-29 NOTE — ED PROVIDER NOTE - PATIENT PORTAL LINK FT
You can access the FollowMyHealth Patient Portal offered by [facility] by registering at the following website: [email or web address]. By joining [employer]’s FollowMyHealth portal, you will also be able to view your health information using other applications (apps) compatible with our system.

## 2021-09-30 PROCEDURE — 70486 CT MAXILLOFACIAL W/O DYE: CPT | Mod: MA

## 2021-09-30 PROCEDURE — 12011 RPR F/E/E/N/L/M 2.5 CM/<: CPT

## 2021-09-30 PROCEDURE — 90471 IMMUNIZATION ADMIN: CPT

## 2021-09-30 PROCEDURE — 72125 CT NECK SPINE W/O DYE: CPT | Mod: MA

## 2021-09-30 PROCEDURE — 99284 EMERGENCY DEPT VISIT MOD MDM: CPT | Mod: 25

## 2021-09-30 PROCEDURE — 70450 CT HEAD/BRAIN W/O DYE: CPT | Mod: MA

## 2021-09-30 PROCEDURE — 73590 X-RAY EXAM OF LOWER LEG: CPT

## 2021-09-30 PROCEDURE — 71046 X-RAY EXAM CHEST 2 VIEWS: CPT

## 2021-09-30 PROCEDURE — 76377 3D RENDER W/INTRP POSTPROCES: CPT

## 2021-09-30 PROCEDURE — 73562 X-RAY EXAM OF KNEE 3: CPT

## 2021-09-30 PROCEDURE — 71120 X-RAY EXAM BREASTBONE 2/>VWS: CPT

## 2021-09-30 PROCEDURE — 90715 TDAP VACCINE 7 YRS/> IM: CPT

## 2021-09-30 RX ADMIN — Medication 1 TABLET(S): at 00:12

## 2021-10-13 ENCOUNTER — NON-APPOINTMENT (OUTPATIENT)
Age: 61
End: 2021-10-13

## 2021-10-13 ENCOUNTER — APPOINTMENT (OUTPATIENT)
Dept: CARDIOLOGY | Facility: CLINIC | Age: 61
End: 2021-10-13
Payer: MEDICARE

## 2021-10-13 VITALS
BODY MASS INDEX: 21.57 KG/M2 | DIASTOLIC BLOOD PRESSURE: 70 MMHG | WEIGHT: 107 LBS | TEMPERATURE: 97.8 F | HEIGHT: 59 IN | OXYGEN SATURATION: 99 % | HEART RATE: 71 BPM | SYSTOLIC BLOOD PRESSURE: 122 MMHG

## 2021-10-13 DIAGNOSIS — R09.89 OTHER SPECIFIED SYMPTOMS AND SIGNS INVOLVING THE CIRCULATORY AND RESPIRATORY SYSTEMS: ICD-10-CM

## 2021-10-13 DIAGNOSIS — R62.0 DELAYED MILESTONE IN CHILDHOOD: ICD-10-CM

## 2021-10-13 PROCEDURE — 93880 EXTRACRANIAL BILAT STUDY: CPT

## 2021-10-13 PROCEDURE — 93000 ELECTROCARDIOGRAM COMPLETE: CPT

## 2021-10-13 PROCEDURE — 99214 OFFICE O/P EST MOD 30 MIN: CPT

## 2021-10-13 NOTE — DISCUSSION/SUMMARY
[___ Month(s)] : in [unfilled] month(s) [FreeTextEntry1] : I have made no changes to her medication regimen and she will continue on her background supportive care pills vitamins and supplements. To better assess carotid disease burden and serve as a cardiovascular risk marker, especially with the possibility of a left carotid bruit on exam, I've ordered a carotid bilateral duplex study. I recommended a heart healthy lifestyle including a low-saturated fat, low cholesterol diet with improved aerobic physical fitness over time for cardiovascular benefits. Carbohydrate and sodium restriction along with weight loss over time encouraged. We will call the patient with test results and followup with you for general care. Follow-up with you for care and see me in 6 months or sooner if needed.

## 2021-10-13 NOTE — HISTORY OF PRESENT ILLNESS
[FreeTextEntry1] : She is 61-year-old with labile attention and background dyslipidemia with developmental delay and Down syndrome feeling generally fine unfortunately had a recent fall and dental injury. No current chest complaints otherwise eating healthy and feeling fine. Available labs and data reviewed. Some dizziness noted during recent fall.

## 2021-10-13 NOTE — CARDIOLOGY SUMMARY
[de-identified] : Sinus  Rhythm  - occasional ectopic ventricular beat    -Left atrial enlargement.  Nonspecific T wave abnormality.  BORDERLINE

## 2022-04-13 ENCOUNTER — NON-APPOINTMENT (OUTPATIENT)
Age: 62
End: 2022-04-13

## 2022-04-13 ENCOUNTER — APPOINTMENT (OUTPATIENT)
Dept: CARDIOLOGY | Facility: CLINIC | Age: 62
End: 2022-04-13
Payer: MEDICARE

## 2022-04-13 VITALS
SYSTOLIC BLOOD PRESSURE: 130 MMHG | OXYGEN SATURATION: 97 % | TEMPERATURE: 96.7 F | HEART RATE: 67 BPM | WEIGHT: 109 LBS | DIASTOLIC BLOOD PRESSURE: 80 MMHG | BODY MASS INDEX: 21.97 KG/M2 | HEIGHT: 59 IN

## 2022-04-13 PROCEDURE — 93000 ELECTROCARDIOGRAM COMPLETE: CPT

## 2022-04-13 PROCEDURE — 99213 OFFICE O/P EST LOW 20 MIN: CPT

## 2022-04-13 NOTE — CARDIOLOGY SUMMARY
[de-identified] : Sinus  Rhythm  -Left atrial enlargement.   -  Negative T-waves  -May be normal -possible.  BORDERLINE

## 2022-04-13 NOTE — DISCUSSION/SUMMARY
[___ Month(s)] : in [unfilled] month(s) [FreeTextEntry1] : Continue her thyroid medication vitamins.  No cardiac testing indicated at present.  Will stay off blood pressure and lipid-lowering pills now.  Heart healthy diet and age-appropriate fitness recommended for heart health.  Follow-up with you for care and see me in 6 months or sooner if needed.

## 2022-04-13 NOTE — HISTORY OF PRESENT ILLNESS
[FreeTextEntry1] : Cande is a pleasant 61-year-old female with history of Down syndrome and developmental delay following up in the office today without current chest symptoms feeling otherwise fine.  Blood pressures recently have been acceptable.  Reviewed available labs and cardiac data.

## 2022-07-15 ENCOUNTER — EMERGENCY (EMERGENCY)
Facility: HOSPITAL | Age: 62
LOS: 1 days | Discharge: HOME | End: 2022-07-15
Attending: EMERGENCY MEDICINE
Payer: MEDICARE

## 2022-07-15 VITALS
HEIGHT: 59 IN | SYSTOLIC BLOOD PRESSURE: 128 MMHG | WEIGHT: 106.92 LBS | DIASTOLIC BLOOD PRESSURE: 84 MMHG | TEMPERATURE: 99 F | OXYGEN SATURATION: 99 % | HEART RATE: 69 BPM | RESPIRATION RATE: 18 BRPM

## 2022-07-15 DIAGNOSIS — Z96.653 PRESENCE OF ARTIFICIAL KNEE JOINT, BILATERAL: Chronic | ICD-10-CM

## 2022-07-15 PROCEDURE — 12001 RPR S/N/AX/GEN/TRNK 2.5CM/<: CPT

## 2022-07-15 PROCEDURE — 93010 ELECTROCARDIOGRAM REPORT: CPT | Mod: 59

## 2022-07-15 PROCEDURE — 99283 EMERGENCY DEPT VISIT LOW MDM: CPT

## 2022-07-15 PROCEDURE — 99284 EMERGENCY DEPT VISIT MOD MDM: CPT | Mod: 25

## 2022-07-15 NOTE — ED PROVIDER NOTE - PATIENT PORTAL LINK FT
You can access the FollowMyHealth Patient Portal offered by [facility] by registering at the following website: [email or web address]. By joining [employer]’s FollowMyHealth portal, you will also be able to view your health information using other applications (apps) compatible with our system.

## 2022-07-15 NOTE — ED PROVIDER NOTE - NSICDXPASTMEDICALHX_GEN_ALL_CORE_FT
PAST MEDICAL HISTORY:  Cellulitis     Down syndrome     Hypothyroid     Mental retardation     Osteoarthritis

## 2022-07-15 NOTE — ED ADULT NURSE NOTE - OBJECTIVE STATEMENT
Pt presents to the ED hx cognitive impairment. Pt presents to the ED, awake and alert.  hx cognitive impairment, w sister and aide at bedside. Pt coming from group home. Was on chair and fell, noted w posterior scalp lac. Denies LOC. Pt stood up out of seat, lost balance and fell onto buttocks on the floor and head hit her head on the arm of the chair. Tdap UTD. No hemorrhage noted.

## 2022-07-15 NOTE — ED PROVIDER NOTE - NSFOLLOWUPINSTRUCTIONS_ED_ALL_ED_FT
.dclacrepair      For staple care:   •Keep the wound fully dry for the first 24 hours, or as told by your doctor. After that, you may take a shower or a bath. Do not soak the wound in water until after the stitches or staples have been taken out.  •Clean the wound once a day, or as told by your doctor. To do this:  •Wash the wound with soap and water.  •Rinse the wound with water to remove all soap.  •Pat the wound dry with a clean towel. Do not rub the wound.    •After you clean the wound, put a thin layer of antibiotic ointment, another ointment, or a nonstick bandage on it as told by your doctor. This will help to:  •Prevent infection.  •Keep the bandage from sticking to the wound.  •Have your stitches or staples taken out as told by your doctor. 1. Keep the wound clean and dry for 24 hours then gently rinse the wound daily without scrubbing.  Apply neosporin or bacitracin 1-2 times daily.  Change the dressing daily.  2. Return to the ED with new or worsening symptoms including fever, pus, redness, or uncontrolled pain.  3. Please follow up with your primary care physician or return to the Emergency Department to remove the TWO staples in 5-7 days.   --  You were seen and evaluated in the Emergency Department for your mechanical fall.     Clinical examination and history demonstrated no acute evidence of any life-threatening risks to your health as a result of your fall.     While emergent evaluation does not demonstrate any immediate life-threats, we strongly recommend you follow up with primary care doctor for a comprehensive evaluation of your health.     Should you develop nausea, vomiting, worsening headaches, inability to walk properly, numbness or tingling in the extremities, dizziness, or changes to your hearing/vision - please immediately return to the Emergency Department for evaluation of your symptoms.     Should your headaches persist, you develop concussion symptoms (see attached packet) you can call the following number to schedule an appointment with our Neurology partners:    [facility]  Neurology  [address] [place] [address] [address]rd [place] [address]  Phone: [phone]  --  For staple care:   •Keep the wound fully dry for the first 24 hours, or as told by your doctor. After that, you may take a shower or a bath. Do not soak the wound in water until after the stitches or staples have been taken out.  •Clean the wound once a day, or as told by your doctor. To do this:  •Wash the wound with soap and water.  •Rinse the wound with water to remove all soap.  •Pat the wound dry with a clean towel. Do not rub the wound.    •After you clean the wound, put a thin layer of antibiotic ointment, another ointment, or a nonstick bandage on it as told by your doctor. This will help to:  •Prevent infection.  •Keep the bandage from sticking to the wound.  •Have your stitches or staples taken out as told by your doctor.

## 2022-07-15 NOTE — ED PROVIDER NOTE - OBJECTIVE STATEMENT
62F hx cognitive impairment, hypothyroid, here w head lac sp mechanical trip and fall just over 6 hours ago (~3:30pm). Pt went to stand up out of seat, lost balance and 62F hx cognitive impairment, hypothyroid, presents w her sister who is her guardian and a medical aide from the group home w head lac sp mechanical trip and fall just over 6 hours ago (~3:30pm). Pt went to stand up out of seat, lost balance and fell onto buttocks on the floor and head hit her head on the arm of the chair. No LOC. Has been acting at baseline. Ambulatory. NO NV. Not on AC or AP. Tdap is UTD as of 9/21.

## 2022-07-15 NOTE — ED PROVIDER NOTE - ATTENDING CONTRIBUTION TO CARE
62F hx cognitive impairment, hypothyroid, no AC or AP here for evaluation of scalp lac. Injury is low mechanism, occurred >6 hours PTA, no loc, no nv, acting at baseline. D/w sister that CT head is not necessary given above, she is in agreement w plan, Tdap is UTD, will repair lac, and DC.

## 2022-07-15 NOTE — ED ADULT TRIAGE NOTE - CHIEF COMPLAINT QUOTE
poss slip and fall unwitnessed Head lac Denies blood thinners Denies LOC Come from group Home Sister Sumi

## 2022-07-15 NOTE — ED PROVIDER NOTE - PHYSICAL EXAMINATION
Gen: WNWD NAD  HEENT: R sided scalp lac to vertex ~2cm no longer bleeding PERRL EOMI normal pharynx  Neck: supple  Back: no midline CTL spine TTP   CV: RRR, no murmur  Lung: CTA BL  Abd: +BS soft NTND  Ext: wwp, palp pulses, FROMx4, no cce, pelvis stable, no bony hip or pelvis TTP   Neuro: Awake alert at baseline CN grossly intact, no FND

## 2022-07-15 NOTE — ED PROVIDER NOTE - HIV OFFER
12/3/2018         RE: Mirta Cardenas  [id number] [facility] [id number]        Dear Colleague,    Thank you for referring your patient, Mirta Cardenas, to the [facility]. Please see a copy of my visit note below.    Here today for NBUVB for psoriasis  Treatment # 19  No issues   photoproection on eyes, lips, nipples  1298 mj 3 minute 11 seconds today  Mineral oil applied  Goal 3 times weekly    Again, thank you for allowing me to participate in the care of your patient.        Sincerely,        Hailee Gale PA-C    
Previously Declined (within the last year)

## 2022-09-26 NOTE — OBJECTIVE
[History reviewed] : History reviewed. [Medications and Allergies reviewed] : Medications and allergies reviewed. Patient/Caregiver provided printed discharge information.

## 2022-11-09 ENCOUNTER — APPOINTMENT (OUTPATIENT)
Dept: CARDIOLOGY | Facility: CLINIC | Age: 62
End: 2022-11-09

## 2022-11-09 ENCOUNTER — NON-APPOINTMENT (OUTPATIENT)
Age: 62
End: 2022-11-09

## 2022-11-09 VITALS
OXYGEN SATURATION: 98 % | SYSTOLIC BLOOD PRESSURE: 122 MMHG | HEIGHT: 59 IN | HEART RATE: 58 BPM | DIASTOLIC BLOOD PRESSURE: 70 MMHG | TEMPERATURE: 97.5 F

## 2022-11-09 DIAGNOSIS — R94.31 ABNORMAL ELECTROCARDIOGRAM [ECG] [EKG]: ICD-10-CM

## 2022-11-09 PROBLEM — E03.9 HYPOTHYROIDISM, UNSPECIFIED: Chronic | Status: ACTIVE | Noted: 2022-07-15

## 2022-11-09 PROCEDURE — 93000 ELECTROCARDIOGRAM COMPLETE: CPT

## 2022-11-09 PROCEDURE — 99213 OFFICE O/P EST LOW 20 MIN: CPT

## 2022-11-09 PROCEDURE — 93306 TTE W/DOPPLER COMPLETE: CPT

## 2022-11-09 NOTE — CARDIOLOGY SUMMARY
[de-identified] : Sinus  Rhythm  -Left atrial enlargement.  Precordial T wave depression.  ABNORMAL

## 2022-11-09 NOTE — DISCUSSION/SUMMARY
[___ Month(s)] : in [unfilled] month(s) [FreeTextEntry1] : She will stay on her background medication therapy for Synthroid treatment of thyroid disease.  Heart healthy diet and age-appropriate fitness he can encouraged.  To further cardiac risk stratify, I have ordered an echocardiogram to assess LV function and valvular status.  Insert will call otherwise see me in 6 months or sooner if needed.

## 2022-11-09 NOTE — HISTORY OF PRESENT ILLNESS
[FreeTextEntry1] : Cande is a pleasant 62-year-old with developmental delay, Down syndrome history, labile blood pressure and previous dizziness and near syncope which has not been an issue lately and she comes in today for cardiac checkup.  No current chest symptoms reported.  She is eating generally well and ambulating slowly with a cane given her history of orthopedic surgery.  Available labs and cardiac data reviewed.

## 2023-05-10 ENCOUNTER — APPOINTMENT (OUTPATIENT)
Dept: CARDIOLOGY | Facility: CLINIC | Age: 63
End: 2023-05-10
Payer: MEDICARE

## 2023-05-10 ENCOUNTER — NON-APPOINTMENT (OUTPATIENT)
Age: 63
End: 2023-05-10

## 2023-05-10 VITALS
HEART RATE: 67 BPM | SYSTOLIC BLOOD PRESSURE: 110 MMHG | WEIGHT: 119 LBS | DIASTOLIC BLOOD PRESSURE: 70 MMHG | BODY MASS INDEX: 23.99 KG/M2 | OXYGEN SATURATION: 98 % | HEIGHT: 59 IN

## 2023-05-10 DIAGNOSIS — R09.89 OTHER SPECIFIED SYMPTOMS AND SIGNS INVOLVING THE CIRCULATORY AND RESPIRATORY SYSTEMS: ICD-10-CM

## 2023-05-10 DIAGNOSIS — E03.9 HYPOTHYROIDISM, UNSPECIFIED: ICD-10-CM

## 2023-05-10 PROCEDURE — 93000 ELECTROCARDIOGRAM COMPLETE: CPT

## 2023-05-10 PROCEDURE — 99213 OFFICE O/P EST LOW 20 MIN: CPT

## 2023-05-10 NOTE — HISTORY OF PRESENT ILLNESS
[FreeTextEntry1] : Cande is a pleasant 62-year-old with Down syndrome, developmental delay, labile hypertension which lately has been proved no current dizziness or near syncopal spells.  Eating generally healthy and ambulating with a rolling walker.

## 2023-05-10 NOTE — CARDIOLOGY SUMMARY
[de-identified] : Sinus  Rhythm  -Left atrial enlargement.   -  Negative T-waves  -May be normal  BORDERLINE

## 2023-05-10 NOTE — DISCUSSION/SUMMARY
[___ Month(s)] : in [unfilled] month(s) [FreeTextEntry1] : She is advised to recheck labs including thyroid level.  She is advised to have follow-up lab check including thyroid level for proper Synthroid dosing as her TSH was elevated previously.  Heart healthy diet and age-appropriate activities recommended.  No cardiac testing indicated now.  Follow-up care with you and see me in about 6 months or sooner should the need arise.

## 2023-06-02 NOTE — ED ADULT TRIAGE NOTE - ACCOMPANIED BY
GOAL: Patient will demonstrate an increase in static/dynamic balance in sitting/standing where deficient by at least 1 grade within 2 weeks to facilitate greater independence during functional mobility and ADL's. Immediate family member

## 2023-11-22 ENCOUNTER — APPOINTMENT (OUTPATIENT)
Dept: CARDIOLOGY | Facility: CLINIC | Age: 63
End: 2023-11-22

## 2024-01-23 ENCOUNTER — NON-APPOINTMENT (OUTPATIENT)
Age: 64
End: 2024-01-23

## 2024-01-23 ENCOUNTER — APPOINTMENT (OUTPATIENT)
Dept: CARDIOLOGY | Facility: CLINIC | Age: 64
End: 2024-01-23
Payer: MEDICARE

## 2024-01-23 VITALS
BODY MASS INDEX: 24.19 KG/M2 | HEART RATE: 69 BPM | DIASTOLIC BLOOD PRESSURE: 70 MMHG | OXYGEN SATURATION: 98 % | WEIGHT: 120 LBS | HEIGHT: 59 IN | SYSTOLIC BLOOD PRESSURE: 110 MMHG

## 2024-01-23 DIAGNOSIS — I10 ESSENTIAL (PRIMARY) HYPERTENSION: ICD-10-CM

## 2024-01-23 DIAGNOSIS — E78.5 HYPERLIPIDEMIA, UNSPECIFIED: ICD-10-CM

## 2024-01-23 DIAGNOSIS — Q90.2 TRISOMY 21, TRANSLOCATION: ICD-10-CM

## 2024-01-23 PROCEDURE — 93000 ELECTROCARDIOGRAM COMPLETE: CPT

## 2024-01-23 PROCEDURE — 99213 OFFICE O/P EST LOW 20 MIN: CPT

## 2024-01-23 RX ORDER — LEVOTHYROXINE SODIUM 112 UG/1
112 TABLET ORAL
Refills: 0 | Status: ACTIVE | COMMUNITY

## 2024-01-23 NOTE — CARDIOLOGY SUMMARY
[de-identified] : 1/23/24, Sinus Rhythm  -Left atrial enlargement. -Poor R-wave progression -nonspecific -consider old anterior infarct.

## 2024-01-23 NOTE — PHYSICAL EXAM
[Well Developed] : well developed [Well Nourished] : well nourished [No Acute Distress] : no acute distress [Normal Conjunctiva] : normal conjunctiva [Normal Venous Pressure] : normal venous pressure [No Carotid Bruit] : no carotid bruit [Normal S1, S2] : normal S1, S2 [No Murmur] : no murmur [No Rub] : no rub [No Gallop] : no gallop [Left Carotid Bruit] : left carotid bruit heard [1+] : left 1+ [Clear Lung Fields] : clear lung fields [Good Air Entry] : good air entry [No Respiratory Distress] : no respiratory distress  [Soft] : abdomen soft [Non Tender] : non-tender [No Masses/organomegaly] : no masses/organomegaly [Normal Bowel Sounds] : normal bowel sounds [No Cyanosis] : no cyanosis [No Clubbing] : no clubbing [No Varicosities] : no varicosities [No Rash] : no rash [No Skin Lesions] : no skin lesions [Moves all extremities] : moves all extremities [No Focal Deficits] : no focal deficits [Normal Speech] : normal speech [Alert and Oriented] : alert and oriented [Normal memory] : normal memory [Right Carotid Bruit] : no bruit heard over the right carotid [Normal Gait] : normal gait [Edema ___] : edema [unfilled]

## 2024-01-23 NOTE — DISCUSSION/SUMMARY
[___ Month(s)] : in [unfilled] month(s) [EKG obtained to assist in diagnosis and management of assessed problem(s)] : EKG obtained to assist in diagnosis and management of assessed problem(s) [FreeTextEntry1] : Heart healthy diet and age-appropriate activities recommended.  No cardiac testing indicated now.   Increased leg elevation and moisturizer to LE's. Follow-up care with you and see me in about 12 months or sooner should the need arise.

## 2024-07-08 ENCOUNTER — NON-APPOINTMENT (OUTPATIENT)
Age: 64
End: 2024-07-08

## 2024-07-08 ENCOUNTER — APPOINTMENT (OUTPATIENT)
Dept: CARDIOLOGY | Facility: CLINIC | Age: 64
End: 2024-07-08
Payer: MEDICARE

## 2024-07-08 VITALS
DIASTOLIC BLOOD PRESSURE: 80 MMHG | SYSTOLIC BLOOD PRESSURE: 130 MMHG | HEART RATE: 58 BPM | HEIGHT: 59 IN | WEIGHT: 117 LBS | OXYGEN SATURATION: 97 % | BODY MASS INDEX: 23.59 KG/M2

## 2024-07-08 DIAGNOSIS — R94.31 ABNORMAL ELECTROCARDIOGRAM [ECG] [EKG]: ICD-10-CM

## 2024-07-08 DIAGNOSIS — E78.5 HYPERLIPIDEMIA, UNSPECIFIED: ICD-10-CM

## 2024-07-08 DIAGNOSIS — I10 ESSENTIAL (PRIMARY) HYPERTENSION: ICD-10-CM

## 2024-07-08 PROCEDURE — G2211 COMPLEX E/M VISIT ADD ON: CPT

## 2024-07-08 PROCEDURE — 93000 ELECTROCARDIOGRAM COMPLETE: CPT

## 2024-07-08 PROCEDURE — 99214 OFFICE O/P EST MOD 30 MIN: CPT

## 2024-07-08 RX ORDER — PROBIOTIC PRODUCT - TAB 1B-250 MG
TAB ORAL
Refills: 0 | Status: ACTIVE | COMMUNITY

## 2024-07-08 RX ORDER — ALLOPURINOL 100 MG/1
100 TABLET ORAL
Refills: 0 | Status: ACTIVE | COMMUNITY

## 2024-07-08 RX ORDER — DONEPEZIL HYDROCHLORIDE 10 MG/1
10 TABLET ORAL
Refills: 0 | Status: ACTIVE | COMMUNITY

## 2024-07-08 RX ORDER — LEVETIRACETAM 500 MG/1
500 TABLET, FILM COATED ORAL TWICE DAILY
Qty: 180 | Refills: 3 | Status: ACTIVE | COMMUNITY

## 2024-08-12 ENCOUNTER — APPOINTMENT (OUTPATIENT)
Dept: CARDIOLOGY | Facility: CLINIC | Age: 64
End: 2024-08-12
Payer: MEDICARE

## 2024-08-12 PROCEDURE — 93306 TTE W/DOPPLER COMPLETE: CPT

## 2024-12-28 ENCOUNTER — INPATIENT (INPATIENT)
Facility: HOSPITAL | Age: 64
LOS: 2 days | Discharge: NURSING, REHAB OR LONG-TERM CARE | DRG: 998 | End: 2024-12-31
Attending: STUDENT IN AN ORGANIZED HEALTH CARE EDUCATION/TRAINING PROGRAM | Admitting: HOSPITALIST
Payer: MEDICARE

## 2024-12-28 VITALS
OXYGEN SATURATION: 96 % | DIASTOLIC BLOOD PRESSURE: 64 MMHG | RESPIRATION RATE: 18 BRPM | HEIGHT: 59 IN | HEART RATE: 84 BPM | SYSTOLIC BLOOD PRESSURE: 105 MMHG | TEMPERATURE: 100 F | WEIGHT: 117.07 LBS

## 2024-12-28 DIAGNOSIS — Z96.653 PRESENCE OF ARTIFICIAL KNEE JOINT, BILATERAL: Chronic | ICD-10-CM

## 2024-12-28 LAB
ADD ON TEST-SPECIMEN IN LAB: SIGNIFICANT CHANGE UP
ALBUMIN SERPL ELPH-MCNC: 2.9 G/DL — LOW (ref 3.3–5)
ALP SERPL-CCNC: 275 U/L — HIGH (ref 40–120)
ALT FLD-CCNC: 32 U/L — SIGNIFICANT CHANGE UP (ref 10–45)
ANION GAP SERPL CALC-SCNC: 14 MMOL/L — SIGNIFICANT CHANGE UP (ref 5–17)
ANISOCYTOSIS BLD QL: SLIGHT — SIGNIFICANT CHANGE UP
APPEARANCE UR: ABNORMAL
APTT BLD: 25.9 SEC — SIGNIFICANT CHANGE UP (ref 24.5–35.6)
AST SERPL-CCNC: 27 U/L — SIGNIFICANT CHANGE UP (ref 10–40)
BACTERIA # UR AUTO: ABNORMAL /HPF
BASOPHILS # BLD AUTO: 0.09 K/UL — SIGNIFICANT CHANGE UP (ref 0–0.2)
BASOPHILS NFR BLD AUTO: 0.9 % — SIGNIFICANT CHANGE UP (ref 0–2)
BILIRUB SERPL-MCNC: 0.4 MG/DL — SIGNIFICANT CHANGE UP (ref 0.2–1.2)
BILIRUB UR-MCNC: NEGATIVE — SIGNIFICANT CHANGE UP
BUN SERPL-MCNC: 14 MG/DL — SIGNIFICANT CHANGE UP (ref 7–23)
CALCIUM SERPL-MCNC: 8.6 MG/DL — SIGNIFICANT CHANGE UP (ref 8.4–10.5)
CAST: 8 /LPF — HIGH (ref 0–4)
CHLORIDE SERPL-SCNC: 107 MMOL/L — SIGNIFICANT CHANGE UP (ref 96–108)
CO2 SERPL-SCNC: 26 MMOL/L — SIGNIFICANT CHANGE UP (ref 22–31)
COLOR SPEC: YELLOW — SIGNIFICANT CHANGE UP
CREAT SERPL-MCNC: 0.94 MG/DL — SIGNIFICANT CHANGE UP (ref 0.5–1.3)
DACRYOCYTES BLD QL SMEAR: SLIGHT — SIGNIFICANT CHANGE UP
DIFF PNL FLD: ABNORMAL
EGFR: 67 ML/MIN/1.73M2 — SIGNIFICANT CHANGE UP
EOSINOPHIL # BLD AUTO: 0.09 K/UL — SIGNIFICANT CHANGE UP (ref 0–0.5)
EOSINOPHIL NFR BLD AUTO: 0.9 % — SIGNIFICANT CHANGE UP (ref 0–6)
FLUAV AG NPH QL: SIGNIFICANT CHANGE UP
FLUBV AG NPH QL: SIGNIFICANT CHANGE UP
GAS PNL BLDV: SIGNIFICANT CHANGE UP
GLUCOSE SERPL-MCNC: 171 MG/DL — HIGH (ref 70–99)
GLUCOSE UR QL: NEGATIVE MG/DL — SIGNIFICANT CHANGE UP
HCT VFR BLD CALC: 39.2 % — SIGNIFICANT CHANGE UP (ref 34.5–45)
HGB BLD-MCNC: 12.7 G/DL — SIGNIFICANT CHANGE UP (ref 11.5–15.5)
INR BLD: 1.05 RATIO — SIGNIFICANT CHANGE UP (ref 0.85–1.16)
KETONES UR-MCNC: NEGATIVE MG/DL — SIGNIFICANT CHANGE UP
LEUKOCYTE ESTERASE UR-ACNC: ABNORMAL
LYMPHOCYTES # BLD AUTO: 0.58 K/UL — LOW (ref 1–3.3)
LYMPHOCYTES # BLD AUTO: 6.1 % — LOW (ref 13–44)
MACROCYTES BLD QL: SLIGHT — SIGNIFICANT CHANGE UP
MANUAL SMEAR VERIFICATION: SIGNIFICANT CHANGE UP
MCHC RBC-ENTMCNC: 32.4 G/DL — SIGNIFICANT CHANGE UP (ref 32–36)
MCHC RBC-ENTMCNC: 32.6 PG — SIGNIFICANT CHANGE UP (ref 27–34)
MCV RBC AUTO: 100.5 FL — HIGH (ref 80–100)
MONOCYTES # BLD AUTO: 0.41 K/UL — SIGNIFICANT CHANGE UP (ref 0–0.9)
MONOCYTES NFR BLD AUTO: 4.3 % — SIGNIFICANT CHANGE UP (ref 2–14)
NEUTROPHILS # BLD AUTO: 8.32 K/UL — HIGH (ref 1.8–7.4)
NEUTROPHILS NFR BLD AUTO: 86.9 % — HIGH (ref 43–77)
NITRITE UR-MCNC: NEGATIVE — SIGNIFICANT CHANGE UP
NRBC # BLD: 1 /100 WBCS — HIGH (ref 0–0)
PH UR: 6.5 — SIGNIFICANT CHANGE UP (ref 5–8)
PLAT MORPH BLD: NORMAL — SIGNIFICANT CHANGE UP
PLATELET # BLD AUTO: 376 K/UL — SIGNIFICANT CHANGE UP (ref 150–400)
POIKILOCYTOSIS BLD QL AUTO: SLIGHT — SIGNIFICANT CHANGE UP
POTASSIUM SERPL-MCNC: 3 MMOL/L — LOW (ref 3.5–5.3)
POTASSIUM SERPL-SCNC: 3 MMOL/L — LOW (ref 3.5–5.3)
PROT SERPL-MCNC: 6.8 G/DL — SIGNIFICANT CHANGE UP (ref 6–8.3)
PROT UR-MCNC: 100 MG/DL
PROTHROM AB SERPL-ACNC: 12 SEC — SIGNIFICANT CHANGE UP (ref 9.9–13.4)
RBC # BLD: 3.9 M/UL — SIGNIFICANT CHANGE UP (ref 3.8–5.2)
RBC # FLD: 15.1 % — HIGH (ref 10.3–14.5)
RBC BLD AUTO: ABNORMAL
RBC CASTS # UR COMP ASSIST: 13 /HPF — HIGH (ref 0–4)
REVIEW: SIGNIFICANT CHANGE UP
RSV RNA NPH QL NAA+NON-PROBE: SIGNIFICANT CHANGE UP
SARS-COV-2 RNA SPEC QL NAA+PROBE: SIGNIFICANT CHANGE UP
SODIUM SERPL-SCNC: 147 MMOL/L — HIGH (ref 135–145)
SP GR SPEC: 1.01 — SIGNIFICANT CHANGE UP (ref 1–1.03)
SQUAMOUS # UR AUTO: 16 /HPF — HIGH (ref 0–5)
UROBILINOGEN FLD QL: 1 MG/DL — SIGNIFICANT CHANGE UP (ref 0.2–1)
VARIANT LYMPHS # BLD: 0.9 % — SIGNIFICANT CHANGE UP (ref 0–6)
WBC # BLD: 9.57 K/UL — SIGNIFICANT CHANGE UP (ref 3.8–10.5)
WBC # FLD AUTO: 9.57 K/UL — SIGNIFICANT CHANGE UP (ref 3.8–10.5)
WBC UR QL: 833 /HPF — HIGH (ref 0–5)

## 2024-12-28 PROCEDURE — 99285 EMERGENCY DEPT VISIT HI MDM: CPT | Mod: 25,GC

## 2024-12-28 PROCEDURE — 12011 RPR F/E/E/N/L/M 2.5 CM/<: CPT

## 2024-12-28 PROCEDURE — 71045 X-RAY EXAM CHEST 1 VIEW: CPT | Mod: 26

## 2024-12-28 RX ORDER — POTASSIUM CHLORIDE 600 MG/1
10 TABLET, FILM COATED, EXTENDED RELEASE ORAL
Refills: 0 | Status: COMPLETED | OUTPATIENT
Start: 2024-12-28 | End: 2024-12-29

## 2024-12-28 RX ORDER — CEFTRIAXONE SODIUM 1 G/1
1000 INJECTION, POWDER, FOR SOLUTION INTRAMUSCULAR; INTRAVENOUS ONCE
Refills: 0 | Status: COMPLETED | OUTPATIENT
Start: 2024-12-28 | End: 2024-12-28

## 2024-12-28 RX ORDER — ACETAMINOPHEN 80 MG/.8ML
1000 SOLUTION/ DROPS ORAL ONCE
Refills: 0 | Status: COMPLETED | OUTPATIENT
Start: 2024-12-28 | End: 2024-12-28

## 2024-12-28 RX ORDER — LIDOCAINE/PRILOCAINE 2.5 %-2.5%
1 CREAM (GRAM) TOPICAL ONCE
Refills: 0 | Status: COMPLETED | OUTPATIENT
Start: 2024-12-28 | End: 2024-12-28

## 2024-12-28 RX ORDER — SODIUM CHLORIDE 9 MG/ML
1600 INJECTION, SOLUTION INTRAMUSCULAR; INTRAVENOUS; SUBCUTANEOUS ONCE
Refills: 0 | Status: COMPLETED | OUTPATIENT
Start: 2024-12-28 | End: 2024-12-28

## 2024-12-28 RX ADMIN — POTASSIUM CHLORIDE 100 MILLIEQUIVALENT(S): 600 TABLET, FILM COATED, EXTENDED RELEASE ORAL at 23:57

## 2024-12-28 RX ADMIN — ACETAMINOPHEN 400 MILLIGRAM(S): 80 SOLUTION/ DROPS ORAL at 21:13

## 2024-12-28 RX ADMIN — POTASSIUM CHLORIDE 100 MILLIEQUIVALENT(S): 600 TABLET, FILM COATED, EXTENDED RELEASE ORAL at 22:40

## 2024-12-28 RX ADMIN — Medication 1 APPLICATION(S): at 23:34

## 2024-12-28 RX ADMIN — CEFTRIAXONE SODIUM 100 MILLIGRAM(S): 1 INJECTION, POWDER, FOR SOLUTION INTRAMUSCULAR; INTRAVENOUS at 22:39

## 2024-12-28 RX ADMIN — SODIUM CHLORIDE 1600 MILLILITER(S): 9 INJECTION, SOLUTION INTRAMUSCULAR; INTRAVENOUS; SUBCUTANEOUS at 21:13

## 2024-12-28 NOTE — ED PROVIDER NOTE - CLINICAL SUMMARY MEDICAL DECISION MAKING FREE TEXT BOX
64-year-old female past medical history of MR, Alzheimer's dementia, ANO x 0, hypothyroid, arthritis presents to ED from group home for witnessed fall few hours prior to arrival.  Patient had mechanical fall, was getting dressed, was pulling up diaper, fell forward striking chin and lip.  Denies LOC, was amatory with walker afterwards, ambulates with walker at baseline.  According to aide at bedside, patient at baseline, patient unable to elucidate ROS.  Unknown prodromal symptoms prior to fall.  Unknown sick contacts however lives in group home.  EMS found temperature to be 99.9 orally. tetanus UTD    VS febrile to 101. Clinically stable. PE, well appearing, no acute distress, AAOx0. NC small 1cm inferior chin laceration, 1 cm laceration 2cm below lower lip medially, no active bleed, does not cross vermillion border, not through n through, EOMI, normal conjunctiva, mucous membranes moist, LCTAB no w/r/c, no MRG, RRR, abd NDNT, no rebound tenderness or guarding, no CVA ttp, no focal neuro deficits, neurovascularly intact, no bruising, rashes, or erythema. Suspicion for mechanical fall, possibly c/b infectious process, will assess w labs, inf will, CT head/maxface. tetanus UTD.

## 2024-12-28 NOTE — ED ADULT NURSE REASSESSMENT NOTE - NS ED NURSE REASSESS COMMENT FT1
sister at bedside, states that she noticed some blood in pt's left ear. Dried blood noted in pt's left ear. MD Ibarra notified.

## 2024-12-28 NOTE — ED PROVIDER NOTE - ATTENDING CONTRIBUTION TO CARE
Marianne Law DO EM Attending  64-year-old female past medical history of MR, Alzheimer's dementia, ANO x 0, hypothyroid, arthritis presents to ED from group home for witnessed fall few hours prior to arrival.  Patient had mechanical fall, was getting dressed, was pulling up diaper, fell forward striking chin and lip.  Denies LOC, was amatory with walker afterwards, ambulates with walker at baseline.  According to aide at bedside, patient at baseline, patient unable to elucidate ROS.  Unknown prodromal symptoms prior to fall.  Unknown sick contacts however lives in group home.  EMS found temperature to be 99.9 orally. tetanus UTD    PHYSICAL EXAM:  CONSTITUTIONAL: Nontoxic appearing, awake, alert, oriented to person, place, time/situation and in no apparent distress.  HEAD: two 1 cm acerations to chin, no active bleeding. No step offs. No midline cervical tenderness.   EYES: Clear bilaterally, pupils equal, round and reactive to light.  ENMT: Airway patent, Nasal mucosa clear. Mouth with normal mucosa. Uvula is midline.   CARDIAC: Normal rate, regular rhythm. +S1/S2. No murmurs, rubs or gallops.  RESPIRATORY: Breathing unlabored. Breath sounds clear and equal bilaterally.  ABDOMEN:  Soft, nontender, nondistended. No rebound tenderness or guarding.  NEUROLOGICAL: Alert and oriented, no focal deficits, no motor or sensory deficits.   MSK: No clubbing, cyanosis, or edema. Full range of motion of all extremities.   SKIN: Skin warm and dry. No evidence of rashes or lesions.    DDx includes but not limited to ich, hematologic/electrolyte abnormalities, infection (uti, pna, viral syndrome). Plan to obtain CT, labs, UA, CXR, swab, repair laceration, and likely will require admission. Marianne Law DO EM Attending  64-year-old female past medical history of down syndrome, Alzheimer's dementia, ANO x 0, hypothyroid, arthritis presents to ED from group home for witnessed fall few hours prior to arrival.  Patient had mechanical fall, was getting dressed, was pulling up diaper, fell forward striking chin and lip.  Denies LOC, was amatory with walker afterwards, ambulates with walker at baseline.  According to aide at bedside, patient at baseline, patient unable to elucidate ROS.  Unknown prodromal symptoms prior to fall.  Unknown sick contacts however lives in group home.  EMS found temperature to be 99.9 orally. tetanus UTD    PHYSICAL EXAM:  CONSTITUTIONAL: Nontoxic appearing, awake, alert, oriented to person, place, time/situation and in no apparent distress.  HEAD: two 1 cm acerations to chin, no active bleeding. No step offs. No midline cervical tenderness.   EYES: Clear bilaterally, pupils equal, round and reactive to light.  ENMT: Airway patent, Nasal mucosa clear. Mouth with normal mucosa. Uvula is midline.   CARDIAC: Normal rate, regular rhythm. +S1/S2. No murmurs, rubs or gallops.  RESPIRATORY: Breathing unlabored. Breath sounds clear and equal bilaterally.  ABDOMEN:  Soft, nontender, nondistended. No rebound tenderness or guarding.  NEUROLOGICAL: Alert and oriented, no focal deficits, no motor or sensory deficits.   MSK: No clubbing, cyanosis, or edema. Full range of motion of all extremities.   SKIN: Skin warm and dry. No evidence of rashes or lesions.    DDx includes but not limited to ich, hematologic/electrolyte abnormalities, infection (uti, pna, viral syndrome). Plan to obtain CT, labs, UA, CXR, swab, repair laceration, and likely will require admission.

## 2024-12-28 NOTE — ED ADULT NURSE REASSESSMENT NOTE - NS ED NURSE REASSESS COMMENT FT1
Patient straight catheterized using sterile technique as per orders. 2 RN's at the bedside. Sterility maintained. Patient tolerated well. Urine collected and sent to lab. 200ml of urine drained

## 2024-12-28 NOTE — ED ADULT NURSE NOTE - OBJECTIVE STATEMENT
63 y/o F presents to the ED with complaints of mechanical fall. Pt bibems from group home. Per ems, pt had a witnessed slip and fall while attempting to put pants on. +Head strike, denies LOC. 63 y/o F presents to the ED with complaints of mechanical fall. Pt bibems from group home. Per ems, pt had a witnessed slip and fall while attempting to put pants on. +Head strike, denies LOC. Pt gross neuro intact, no difficulty speaking in complete sentences, pulses x 4, bashir x4, abdomen soft nontender nondistended, abrasion on chin, dry blood noted around R nare, laceration on forehead.

## 2024-12-28 NOTE — ED PROVIDER NOTE - OBJECTIVE STATEMENT
64-year-old female past medical history of MR, Alzheimer's dementia, ANO x 0, hypothyroid, arthritis presents to ED from group home for witnessed fall few hours prior to arrival.  Patient had mechanical fall, was getting dressed, was pulling up diaper, fell forward striking chin and lip.  Denies LOC, was amatory with walker afterwards, ambulates with walker at baseline.  According to aide at bedside, patient at baseline, patient unable to elucidate ROS.  Unknown prodromal symptoms prior to fall.  Unknown sick contacts however lives in group home.  EMS found temperature to be 99.9 orally. tetanus UTD

## 2024-12-28 NOTE — ED PROVIDER NOTE - CARE PLAN
1 Principal Discharge DX:	Fall   Principal Discharge DX:	Acute UTI  Secondary Diagnosis:	Fall  Secondary Diagnosis:	Compression fracture of thoracic vertebra  Secondary Diagnosis:	Acute UTI

## 2024-12-28 NOTE — ED ADULT NURSE NOTE - NSFALLRISKINTERV_ED_ALL_ED
Assistance OOB with selected safe patient handling equipment if applicable/Assistance with ambulation/Communicate fall risk and risk factors to all staff, patient, and family/Monitor gait and stability/Provide patient with walking aids/Provide visual cue: yellow wristband, yellow gown, etc/Reinforce activity limits and safety measures with patient and family/Use of alarms - bed, stretcher, chair and/or video monitoring/Call bell, personal items and telephone in reach/Instruct patient to call for assistance before getting out of bed/chair/stretcher/Non-slip footwear applied when patient is off stretcher/[place] to call system/Physically safe environment - no spills, clutter or unnecessary equipment/Purposeful Proactive Rounding/Room/bathroom lighting operational, light cord in reach

## 2024-12-28 NOTE — ED PROVIDER NOTE - PHYSICAL EXAMINATION
Gen: AAOx0, non-toxic  Head: NCAT  HEENT: EOMI, oral mucosa moist, normal conjunctiva. NC small 1cm inferior chin laceration, 1 cm laceration 2cm below lower lip medially, no active bleed, does not cross vermillion border, not through n through,   Lung: CTAB, no respiratory distress, no wheezes/rhonchi/rales B/L,   CV: RRR, no murmurs, rubs or gallops  Abd: soft, NTND, no guarding, no CVA tenderness  MSK: no visible deformities  Neuro: No focal sensory or motor deficits  Skin: Warm, well perfused, no rash  Psych: normal affect.
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
## 2024-12-29 DIAGNOSIS — E03.9 HYPOTHYROIDISM, UNSPECIFIED: ICD-10-CM

## 2024-12-29 DIAGNOSIS — S22.080A WEDGE COMPRESSION FRACTURE OF T11-T12 VERTEBRA, INITIAL ENCOUNTER FOR CLOSED FRACTURE: ICD-10-CM

## 2024-12-29 DIAGNOSIS — N39.0 URINARY TRACT INFECTION, SITE NOT SPECIFIED: ICD-10-CM

## 2024-12-29 DIAGNOSIS — G40.909 EPILEPSY, UNSPECIFIED, NOT INTRACTABLE, WITHOUT STATUS EPILEPTICUS: ICD-10-CM

## 2024-12-29 LAB
ADD ON TEST-SPECIMEN IN LAB: SIGNIFICANT CHANGE UP
ANION GAP SERPL CALC-SCNC: 11 MMOL/L — SIGNIFICANT CHANGE UP (ref 5–17)
APPEARANCE UR: CLEAR — SIGNIFICANT CHANGE UP
BACTERIA # UR AUTO: ABNORMAL /HPF
BILIRUB UR-MCNC: NEGATIVE — SIGNIFICANT CHANGE UP
BUN SERPL-MCNC: 10 MG/DL — SIGNIFICANT CHANGE UP (ref 7–23)
CALCIUM SERPL-MCNC: 8.1 MG/DL — LOW (ref 8.4–10.5)
CAST: 1 /LPF — SIGNIFICANT CHANGE UP (ref 0–4)
CHLORIDE SERPL-SCNC: 109 MMOL/L — HIGH (ref 96–108)
CO2 SERPL-SCNC: 24 MMOL/L — SIGNIFICANT CHANGE UP (ref 22–31)
COLOR SPEC: YELLOW — SIGNIFICANT CHANGE UP
CREAT SERPL-MCNC: 0.79 MG/DL — SIGNIFICANT CHANGE UP (ref 0.5–1.3)
DIFF PNL FLD: NEGATIVE — SIGNIFICANT CHANGE UP
EGFR: 83 ML/MIN/1.73M2 — SIGNIFICANT CHANGE UP
GLUCOSE SERPL-MCNC: 118 MG/DL — HIGH (ref 70–99)
GLUCOSE UR QL: NEGATIVE MG/DL — SIGNIFICANT CHANGE UP
KETONES UR-MCNC: 15 MG/DL
LACTATE SERPL-SCNC: 1 MMOL/L — SIGNIFICANT CHANGE UP (ref 0.5–2)
LEUKOCYTE ESTERASE UR-ACNC: ABNORMAL
MAGNESIUM SERPL-MCNC: 2 MG/DL — SIGNIFICANT CHANGE UP (ref 1.6–2.6)
NITRITE UR-MCNC: NEGATIVE — SIGNIFICANT CHANGE UP
PH UR: 7 — SIGNIFICANT CHANGE UP (ref 5–8)
POTASSIUM SERPL-MCNC: 3.1 MMOL/L — LOW (ref 3.5–5.3)
POTASSIUM SERPL-SCNC: 3.1 MMOL/L — LOW (ref 3.5–5.3)
PROT UR-MCNC: SIGNIFICANT CHANGE UP MG/DL
RBC CASTS # UR COMP ASSIST: 4 /HPF — SIGNIFICANT CHANGE UP (ref 0–4)
SODIUM SERPL-SCNC: 144 MMOL/L — SIGNIFICANT CHANGE UP (ref 135–145)
SP GR SPEC: 1.01 — SIGNIFICANT CHANGE UP (ref 1–1.03)
SQUAMOUS # UR AUTO: 1 /HPF — SIGNIFICANT CHANGE UP (ref 0–5)
UROBILINOGEN FLD QL: 0.2 MG/DL — SIGNIFICANT CHANGE UP (ref 0.2–1)
WBC UR QL: 17 /HPF — HIGH (ref 0–5)

## 2024-12-29 PROCEDURE — 76377 3D RENDER W/INTRP POSTPROCES: CPT | Mod: 26

## 2024-12-29 PROCEDURE — 70450 CT HEAD/BRAIN W/O DYE: CPT | Mod: 26,MC

## 2024-12-29 PROCEDURE — 99223 1ST HOSP IP/OBS HIGH 75: CPT

## 2024-12-29 PROCEDURE — 71250 CT THORAX DX C-: CPT | Mod: 26,MC

## 2024-12-29 PROCEDURE — 70486 CT MAXILLOFACIAL W/O DYE: CPT | Mod: 26,MC

## 2024-12-29 RX ORDER — GINKGO BILOBA 40 MG
3 CAPSULE ORAL AT BEDTIME
Refills: 0 | Status: DISCONTINUED | OUTPATIENT
Start: 2024-12-29 | End: 2024-12-31

## 2024-12-29 RX ORDER — FAMOTIDINE 20 MG/1
1 TABLET, FILM COATED ORAL
Refills: 0 | DISCHARGE

## 2024-12-29 RX ORDER — LATANOPROST 50 UG/ML
1 SOLUTION OPHTHALMIC AT BEDTIME
Refills: 0 | Status: DISCONTINUED | OUTPATIENT
Start: 2024-12-29 | End: 2024-12-31

## 2024-12-29 RX ORDER — FAMOTIDINE 20 MG/1
20 TABLET, FILM COATED ORAL DAILY
Refills: 0 | Status: DISCONTINUED | OUTPATIENT
Start: 2024-12-29 | End: 2024-12-31

## 2024-12-29 RX ORDER — ONDANSETRON 4 MG/1
4 TABLET ORAL EVERY 8 HOURS
Refills: 0 | Status: DISCONTINUED | OUTPATIENT
Start: 2024-12-29 | End: 2024-12-31

## 2024-12-29 RX ORDER — ENOXAPARIN SODIUM 60 MG/.6ML
40 INJECTION INTRAVENOUS; SUBCUTANEOUS EVERY 24 HOURS
Refills: 0 | Status: DISCONTINUED | OUTPATIENT
Start: 2024-12-29 | End: 2024-12-31

## 2024-12-29 RX ORDER — CETIRIZINE HYDROCHLORIDE 5 MG/5ML
10 SYRUP ORAL DAILY
Refills: 0 | Status: DISCONTINUED | OUTPATIENT
Start: 2024-12-29 | End: 2024-12-31

## 2024-12-29 RX ORDER — METHOTREXATE 25 MG/ML
4 INJECTION, SOLUTION INTRA-ARTERIAL; INTRAMUSCULAR; INTRATHECAL; INTRAVENOUS
Refills: 0 | DISCHARGE

## 2024-12-29 RX ORDER — LIDOCAINE/PRILOCAINE 2.5 %-2.5%
1 CREAM (GRAM) TOPICAL ONCE
Refills: 0 | Status: COMPLETED | OUTPATIENT
Start: 2024-12-29 | End: 2024-12-29

## 2024-12-29 RX ORDER — CLONAZEPAM 2 MG
1 TABLET ORAL
Refills: 0 | DISCHARGE

## 2024-12-29 RX ORDER — BIMATOPROST 0.3 MG/ML
1 SOLUTION/ DROPS OPHTHALMIC
Refills: 0 | DISCHARGE

## 2024-12-29 RX ORDER — LEVOTHYROXINE SODIUM 175 UG/1
1 TABLET ORAL
Refills: 0 | DISCHARGE

## 2024-12-29 RX ORDER — LEVOTHYROXINE SODIUM 175 UG/1
112 TABLET ORAL DAILY
Refills: 0 | Status: DISCONTINUED | OUTPATIENT
Start: 2024-12-29 | End: 2024-12-31

## 2024-12-29 RX ORDER — MAG HYDROX/ALUMINUM HYD/SIMETH 200-200-20
30 SUSPENSION, ORAL (FINAL DOSE FORM) ORAL EVERY 4 HOURS
Refills: 0 | Status: DISCONTINUED | OUTPATIENT
Start: 2024-12-29 | End: 2024-12-31

## 2024-12-29 RX ORDER — GINKGO BILOBA 40 MG
1 CAPSULE ORAL
Refills: 0 | DISCHARGE

## 2024-12-29 RX ORDER — METHOTREXATE 25 MG/ML
10 INJECTION, SOLUTION INTRA-ARTERIAL; INTRAMUSCULAR; INTRATHECAL; INTRAVENOUS
Refills: 0 | Status: DISCONTINUED | OUTPATIENT
Start: 2024-12-29 | End: 2024-12-31

## 2024-12-29 RX ORDER — LEVETIRACETAM 100 MG/ML
1 SOLUTION ORAL
Refills: 0 | DISCHARGE

## 2024-12-29 RX ORDER — POTASSIUM CHLORIDE 600 MG/1
10 TABLET, FILM COATED, EXTENDED RELEASE ORAL
Refills: 0 | Status: COMPLETED | OUTPATIENT
Start: 2024-12-29 | End: 2024-12-29

## 2024-12-29 RX ORDER — IBUPROFEN 200 MG
600 TABLET ORAL EVERY 8 HOURS
Refills: 0 | Status: DISCONTINUED | OUTPATIENT
Start: 2024-12-29 | End: 2024-12-31

## 2024-12-29 RX ORDER — VITAMIN A 10000 UNIT
1 TABLET ORAL
Refills: 0 | DISCHARGE

## 2024-12-29 RX ORDER — PREDNISONE 5 MG
5 TABLET ORAL
Refills: 0 | Status: DISCONTINUED | OUTPATIENT
Start: 2024-12-29 | End: 2024-12-30

## 2024-12-29 RX ORDER — CETIRIZINE HYDROCHLORIDE 5 MG/5ML
1 SYRUP ORAL
Refills: 0 | DISCHARGE

## 2024-12-29 RX ORDER — CLONAZEPAM 2 MG
0.5 TABLET ORAL DAILY
Refills: 0 | Status: DISCONTINUED | OUTPATIENT
Start: 2024-12-29 | End: 2024-12-30

## 2024-12-29 RX ORDER — DONEPEZIL HYDROCHLORIDE 5 MG/1
1 TABLET, FILM COATED ORAL
Refills: 0 | DISCHARGE

## 2024-12-29 RX ORDER — DONEPEZIL HYDROCHLORIDE 5 MG/1
10 TABLET, FILM COATED ORAL AT BEDTIME
Refills: 0 | Status: DISCONTINUED | OUTPATIENT
Start: 2024-12-29 | End: 2024-12-31

## 2024-12-29 RX ORDER — GINKGO BILOBA 40 MG
5 CAPSULE ORAL AT BEDTIME
Refills: 0 | Status: DISCONTINUED | OUTPATIENT
Start: 2024-12-29 | End: 2024-12-31

## 2024-12-29 RX ORDER — LEVETIRACETAM 100 MG/ML
500 SOLUTION ORAL
Refills: 0 | Status: DISCONTINUED | OUTPATIENT
Start: 2024-12-29 | End: 2024-12-31

## 2024-12-29 RX ORDER — IBUPROFEN 200 MG
2 TABLET ORAL
Refills: 0 | DISCHARGE

## 2024-12-29 RX ORDER — CEFTRIAXONE SODIUM 1 G/1
1000 INJECTION, POWDER, FOR SOLUTION INTRAMUSCULAR; INTRAVENOUS EVERY 24 HOURS
Refills: 0 | Status: DISCONTINUED | OUTPATIENT
Start: 2024-12-29 | End: 2024-12-31

## 2024-12-29 RX ORDER — VITAMIN A 10000 UNIT
1 TABLET ORAL DAILY
Refills: 0 | Status: DISCONTINUED | OUTPATIENT
Start: 2024-12-29 | End: 2024-12-31

## 2024-12-29 RX ORDER — FLUTICASONE PROPIONATE 50 UG/1
2 SPRAY, METERED NASAL
Refills: 0 | Status: DISCONTINUED | OUTPATIENT
Start: 2024-12-29 | End: 2024-12-31

## 2024-12-29 RX ADMIN — CETIRIZINE HYDROCHLORIDE 10 MILLIGRAM(S): 5 SYRUP ORAL at 18:19

## 2024-12-29 RX ADMIN — ENOXAPARIN SODIUM 40 MILLIGRAM(S): 60 INJECTION INTRAVENOUS; SUBCUTANEOUS at 18:03

## 2024-12-29 RX ADMIN — LEVETIRACETAM 500 MILLIGRAM(S): 100 SOLUTION ORAL at 22:59

## 2024-12-29 RX ADMIN — Medication 5 MILLIGRAM(S): at 17:54

## 2024-12-29 RX ADMIN — POTASSIUM CHLORIDE 100 MILLIEQUIVALENT(S): 600 TABLET, FILM COATED, EXTENDED RELEASE ORAL at 22:25

## 2024-12-29 RX ADMIN — CEFTRIAXONE SODIUM 100 MILLIGRAM(S): 1 INJECTION, POWDER, FOR SOLUTION INTRAMUSCULAR; INTRAVENOUS at 18:00

## 2024-12-29 RX ADMIN — DONEPEZIL HYDROCHLORIDE 10 MILLIGRAM(S): 5 TABLET, FILM COATED ORAL at 22:59

## 2024-12-29 RX ADMIN — Medication 5 MILLIGRAM(S): at 23:00

## 2024-12-29 RX ADMIN — Medication 1 APPLICATION(S): at 04:01

## 2024-12-29 RX ADMIN — POTASSIUM CHLORIDE 100 MILLIEQUIVALENT(S): 600 TABLET, FILM COATED, EXTENDED RELEASE ORAL at 20:44

## 2024-12-29 RX ADMIN — POTASSIUM CHLORIDE 100 MILLIEQUIVALENT(S): 600 TABLET, FILM COATED, EXTENDED RELEASE ORAL at 23:33

## 2024-12-29 RX ADMIN — FAMOTIDINE 20 MILLIGRAM(S): 20 TABLET, FILM COATED ORAL at 18:19

## 2024-12-29 RX ADMIN — LEVETIRACETAM 500 MILLIGRAM(S): 100 SOLUTION ORAL at 13:05

## 2024-12-29 RX ADMIN — LATANOPROST 1 DROP(S): 50 SOLUTION OPHTHALMIC at 23:00

## 2024-12-29 RX ADMIN — FLUTICASONE PROPIONATE 2 SPRAY(S): 50 SPRAY, METERED NASAL at 18:02

## 2024-12-29 RX ADMIN — Medication 1 MILLIGRAM(S): at 18:20

## 2024-12-29 RX ADMIN — POTASSIUM CHLORIDE 100 MILLIEQUIVALENT(S): 600 TABLET, FILM COATED, EXTENDED RELEASE ORAL at 00:52

## 2024-12-29 NOTE — H&P ADULT - PROBLEM SELECTOR PLAN 2
unable to provide ROS due to dementia, U/A grossly positive but sample with epi cells, given fevers will treat  c/w IV Ceftriaxone 1gm daily x 3 days f/u UCX

## 2024-12-29 NOTE — H&P ADULT - NSHPLABSRESULTS_GEN_ALL_CORE
12.7   9.57  )-----------( 376      ( 28 Dec 2024 21:20 )             39.2     12-    147[H]  |  107  |  14  ----------------------------<  171[H]  3.0[L]   |  26  |  0.94    Ca    8.6      28 Dec 2024 21:20    TPro  6.8  /  Alb  2.9[L]  /  TBili  0.4  /  DBili  x   /  AST  27  /  ALT  32  /  AlkPhos  275[H]  12-    lactate 2.2  Urinalysis Basic - ( 28 Dec 2024 21:32 )    Color: Yellow / Appearance: Turbid / S.013 / pH: x  Gluc: x / Ketone: Negative mg/dL  / Bili: Negative / Urobili: 1.0 mg/dL   Blood: x / Protein: 100 mg/dL / Nitrite: Negative   Leuk Esterase: Large / RBC: 13 /HPF /  /HPF   Sq Epi: x / Non Sq Epi: 16 /HPF / Bacteria: Few /HPF    < from: CT Chest No Cont (..24 @ 01:08) >    LUNGS AND LARGE AIRWAYS: Patent central airways. Hypoventilation.   Bilateral lower lobe, right middle lobe, and lingular atelectatic changes.  PLEURA: Small bilateral pleural effusions, predominantly subpulmonic on   the length, with adjacent compressive atelectasis.  VESSELS: Main pulmonary artery measures 4 cm in diameter suggesting   pulmonary hypertension.  HEART: Heart size is normal. No pericardial effusion.  MEDIASTINUM AND OSMIN: No significant lymphadenopathy.  CHEST WALL AND LOWER NECK: Within normal limits.  VISUALIZED UPPER ABDOMEN: Right-sided intrathoracic kidney with   associated Bochdalek hernia versus congenital diaphragmatic hernia. There   is associated protrusion of the posterior aspect of the liver into the   thoracic cavity. No hydronephrosis. Incompletely visualized ureteric   course.  BONES: Degenerative changes. Age-indeterminate compression fracture of   the T12 vertebral body with anterior wedging.    IMPRESSION:  Small bilateral pleural effusions, predominantly subpulmonic on the   length, with adjacent compressive atelectasis. Correlate clinically for   superimposed infection.    Right-sided Bochdalek hernia versus congenital diaphragmatic hernia   hernia with focal herniation of the posterior liver and right-sided   intrathoracic kidney, likely chronic.    Age-indeterminate compression deformity of the T12 vertebral body with   anterior wedging. MRI recommended for further evaluation, as clinically   warranted.    < end of copied text >    < from: CT Head No Cont (.24 @ 01:07) >    IMPRESSION:    CT HEAD:  No evidence of acute intracranial pathology.    CT MAXILLOFACIAL:  No acute fracture.    < end of copied text >

## 2024-12-29 NOTE — H&P ADULT - PROBLEM SELECTOR PLAN 1
mechanical fall, chin lac s/p repair  also with possible t12 fx per rads will order MRI wwo IV to eval  PT   takes ibuprofen at home for pain will c/w

## 2024-12-29 NOTE — H&P ADULT - NSHPPHYSICALEXAM_GEN_ALL_CORE
Vital Signs Last 24 Hrs  T(C): 36.8 (29 Dec 2024 11:30), Max: 38.4 (28 Dec 2024 21:00)  T(F): 98.3 (29 Dec 2024 11:30), Max: 101.2 (28 Dec 2024 21:00)  HR: 76 (29 Dec 2024 11:30) (67 - 84)  BP: 112/82 (29 Dec 2024 11:30) (98/64 - 142/89)  BP(mean): 76 (28 Dec 2024 23:38) (76 - 76)  RR: 20 (29 Dec 2024 11:30) (16 - 23)  SpO2: 96% (29 Dec 2024 11:30) (94% - 96%)    Parameters below as of 29 Dec 2024 11:30  Patient On (Oxygen Delivery Method): room air        CONSTITUTIONAL: NAD  EYES: PERRL; conjunctiva and sclera clear  ENMT: Moist oral mucosa, no pharyngeal injection or exudates; normal dentition, chin laceration w sutures  RESPIRATORY: Normal respiratory effort; lungs are clear to auscultation bilaterally  CARDIOVASCULAR: Regular rate and rhythm, normal S1 and S2, no murmur/rub/gallop; No lower extremity edema; Peripheral pulses are 2+ bilaterally  ABDOMEN: Nontender to palpation, normoactive bowel sounds, no rebound/guarding;  MUSCULOSKELETAL:   no clubbing or cyanosis of digits; no joint swelling or tenderness to palpation  PSYCH: A+O to person   SKIN: No rashes; no palpable lesions

## 2024-12-29 NOTE — H&P ADULT - HISTORY OF PRESENT ILLNESS
64-year-old female past medical history of Down Syndrome, Alzheimer's dementia, ( aaxo1-2 at baseline) hypothyroid, RA presents to ED from group home for witnessed fall.  Patient had mechanical fall, was getting dressed and was pulling up diaper, fell forward striking chin and lip.  No LOC, dizziness. Sister, Sumi at bedside.

## 2024-12-29 NOTE — H&P ADULT - ASSESSMENT
63 yo F with hx of Down syndrome, RA on MTX. hypothyroidism p/w mechanical fall in the setting of UTI and possible T12 fx

## 2024-12-30 LAB
-  AMOXICILLIN/CLAVULANIC ACID: SIGNIFICANT CHANGE UP
-  AMPICILLIN/SULBACTAM: SIGNIFICANT CHANGE UP
-  AMPICILLIN: SIGNIFICANT CHANGE UP
-  AZTREONAM: SIGNIFICANT CHANGE UP
-  CEFAZOLIN: SIGNIFICANT CHANGE UP
-  CEFEPIME: SIGNIFICANT CHANGE UP
-  CEFOXITIN: SIGNIFICANT CHANGE UP
-  CEFTRIAXONE: SIGNIFICANT CHANGE UP
-  CEFUROXIME: SIGNIFICANT CHANGE UP
-  CIPROFLOXACIN: SIGNIFICANT CHANGE UP
-  ERTAPENEM: SIGNIFICANT CHANGE UP
-  GENTAMICIN: SIGNIFICANT CHANGE UP
-  IMIPENEM: SIGNIFICANT CHANGE UP
-  LEVOFLOXACIN: SIGNIFICANT CHANGE UP
-  MEROPENEM: SIGNIFICANT CHANGE UP
-  NITROFURANTOIN: SIGNIFICANT CHANGE UP
-  PIPERACILLIN/TAZOBACTAM: SIGNIFICANT CHANGE UP
-  TOBRAMYCIN: SIGNIFICANT CHANGE UP
-  TRIMETHOPRIM/SULFAMETHOXAZOLE: SIGNIFICANT CHANGE UP
ANION GAP SERPL CALC-SCNC: 14 MMOL/L — SIGNIFICANT CHANGE UP (ref 5–17)
BUN SERPL-MCNC: 11 MG/DL — SIGNIFICANT CHANGE UP (ref 7–23)
CALCIUM SERPL-MCNC: 7.7 MG/DL — LOW (ref 8.4–10.5)
CHLORIDE SERPL-SCNC: 108 MMOL/L — SIGNIFICANT CHANGE UP (ref 96–108)
CO2 SERPL-SCNC: 24 MMOL/L — SIGNIFICANT CHANGE UP (ref 22–31)
CREAT SERPL-MCNC: 0.81 MG/DL — SIGNIFICANT CHANGE UP (ref 0.5–1.3)
CULTURE RESULTS: ABNORMAL
EGFR: 81 ML/MIN/1.73M2 — SIGNIFICANT CHANGE UP
GLUCOSE SERPL-MCNC: 109 MG/DL — HIGH (ref 70–99)
HCT VFR BLD CALC: 34.2 % — LOW (ref 34.5–45)
HGB BLD-MCNC: 10.8 G/DL — LOW (ref 11.5–15.5)
MAGNESIUM SERPL-MCNC: 2.1 MG/DL — SIGNIFICANT CHANGE UP (ref 1.6–2.6)
MCHC RBC-ENTMCNC: 31.5 PG — SIGNIFICANT CHANGE UP (ref 27–34)
MCHC RBC-ENTMCNC: 31.6 G/DL — LOW (ref 32–36)
MCV RBC AUTO: 99.7 FL — SIGNIFICANT CHANGE UP (ref 80–100)
METHOD TYPE: SIGNIFICANT CHANGE UP
NRBC # BLD: 0 /100 WBCS — SIGNIFICANT CHANGE UP (ref 0–0)
ORGANISM # SPEC MICROSCOPIC CNT: ABNORMAL
ORGANISM # SPEC MICROSCOPIC CNT: ABNORMAL
PHOSPHATE SERPL-MCNC: 2.6 MG/DL — SIGNIFICANT CHANGE UP (ref 2.5–4.5)
PLATELET # BLD AUTO: 357 K/UL — SIGNIFICANT CHANGE UP (ref 150–400)
POTASSIUM SERPL-MCNC: 3.3 MMOL/L — LOW (ref 3.5–5.3)
POTASSIUM SERPL-SCNC: 3.3 MMOL/L — LOW (ref 3.5–5.3)
RBC # BLD: 3.43 M/UL — LOW (ref 3.8–5.2)
RBC # FLD: 15.4 % — HIGH (ref 10.3–14.5)
SODIUM SERPL-SCNC: 146 MMOL/L — HIGH (ref 135–145)
SPECIMEN SOURCE: SIGNIFICANT CHANGE UP
WBC # BLD: 8.92 K/UL — SIGNIFICANT CHANGE UP (ref 3.8–10.5)
WBC # FLD AUTO: 8.92 K/UL — SIGNIFICANT CHANGE UP (ref 3.8–10.5)

## 2024-12-30 PROCEDURE — 99232 SBSQ HOSP IP/OBS MODERATE 35: CPT

## 2024-12-30 RX ORDER — CLONAZEPAM 2 MG
0.5 TABLET ORAL AT BEDTIME
Refills: 0 | Status: DISCONTINUED | OUTPATIENT
Start: 2024-12-30 | End: 2024-12-31

## 2024-12-30 RX ORDER — POTASSIUM CHLORIDE 600 MG/1
10 TABLET, FILM COATED, EXTENDED RELEASE ORAL
Refills: 0 | Status: COMPLETED | OUTPATIENT
Start: 2024-12-30 | End: 2024-12-30

## 2024-12-30 RX ORDER — CARBAMIDE PEROXIDE 6.5 %
5 DROPS OTIC (EAR)
Refills: 0 | Status: DISCONTINUED | OUTPATIENT
Start: 2024-12-30 | End: 2024-12-31

## 2024-12-30 RX ADMIN — POTASSIUM CHLORIDE 100 MILLIEQUIVALENT(S): 600 TABLET, FILM COATED, EXTENDED RELEASE ORAL at 15:58

## 2024-12-30 RX ADMIN — Medication 5 DROP(S): at 18:27

## 2024-12-30 RX ADMIN — LEVOTHYROXINE SODIUM 112 MICROGRAM(S): 175 TABLET ORAL at 05:43

## 2024-12-30 RX ADMIN — Medication 600 MILLIGRAM(S): at 03:44

## 2024-12-30 RX ADMIN — FLUTICASONE PROPIONATE 2 SPRAY(S): 50 SPRAY, METERED NASAL at 05:43

## 2024-12-30 RX ADMIN — FAMOTIDINE 20 MILLIGRAM(S): 20 TABLET, FILM COATED ORAL at 11:55

## 2024-12-30 RX ADMIN — POTASSIUM CHLORIDE 100 MILLIEQUIVALENT(S): 600 TABLET, FILM COATED, EXTENDED RELEASE ORAL at 11:54

## 2024-12-30 RX ADMIN — CETIRIZINE HYDROCHLORIDE 10 MILLIGRAM(S): 5 SYRUP ORAL at 11:55

## 2024-12-30 RX ADMIN — Medication 5 MILLIGRAM(S): at 21:47

## 2024-12-30 RX ADMIN — Medication 600 MILLIGRAM(S): at 04:44

## 2024-12-30 RX ADMIN — Medication 5 MILLIGRAM(S): at 05:42

## 2024-12-30 RX ADMIN — CEFTRIAXONE SODIUM 100 MILLIGRAM(S): 1 INJECTION, POWDER, FOR SOLUTION INTRAMUSCULAR; INTRAVENOUS at 18:08

## 2024-12-30 RX ADMIN — Medication 1 MILLIGRAM(S): at 12:29

## 2024-12-30 RX ADMIN — ENOXAPARIN SODIUM 40 MILLIGRAM(S): 60 INJECTION INTRAVENOUS; SUBCUTANEOUS at 18:09

## 2024-12-30 RX ADMIN — LEVETIRACETAM 500 MILLIGRAM(S): 100 SOLUTION ORAL at 18:08

## 2024-12-30 RX ADMIN — DONEPEZIL HYDROCHLORIDE 10 MILLIGRAM(S): 5 TABLET, FILM COATED ORAL at 21:47

## 2024-12-30 RX ADMIN — FLUTICASONE PROPIONATE 2 SPRAY(S): 50 SPRAY, METERED NASAL at 18:12

## 2024-12-30 RX ADMIN — POTASSIUM CHLORIDE 100 MILLIEQUIVALENT(S): 600 TABLET, FILM COATED, EXTENDED RELEASE ORAL at 14:09

## 2024-12-30 RX ADMIN — Medication 0.5 MILLIGRAM(S): at 21:47

## 2024-12-30 RX ADMIN — LEVETIRACETAM 500 MILLIGRAM(S): 100 SOLUTION ORAL at 05:43

## 2024-12-30 NOTE — PROGRESS NOTE ADULT - PROBLEM SELECTOR PLAN 2
unable to provide ROS due to dementia, U/A grossly positive but sample with epi cells, given fevers will treat  c/w IV Ceftriaxone 1gm daily x 3 days  UCx growing Ecoli

## 2024-12-30 NOTE — PROGRESS NOTE ADULT - ASSESSMENT
65 yo F with hx of Down syndrome, RA on MTX. hypothyroidism p/w mechanical fall in the setting of UTI and possible T12 fx

## 2024-12-30 NOTE — PROGRESS NOTE ADULT - SUBJECTIVE AND OBJECTIVE BOX
PROGRESS NOTE:   Authored by Dr. Olena Bee MD, Available on MS Teams    Patient is a 64y old  Female who presents with a chief complaint of fall (29 Dec 2024 12:56)      SUBJECTIVE / OVERNIGHT EVENTS: Patient feels okay, denies any pain.  Had some bleeding from L ear.     ADDITIONAL REVIEW OF SYSTEMS:    MEDICATIONS  (STANDING):  carbamide peroxide Otic Solution 5 Drop(s) Left Ear two times a day  cefTRIAXone   IVPB 1000 milliGRAM(s) IV Intermittent every 24 hours  cetirizine 10 milliGRAM(s) Oral daily  clonazePAM  Tablet 0.5 milliGRAM(s) Oral at bedtime  donepezil 10 milliGRAM(s) Oral at bedtime  enoxaparin Injectable 40 milliGRAM(s) SubCutaneous every 24 hours  famotidine    Tablet 20 milliGRAM(s) Oral daily  fluticasone propionate 50 MICROgram(s)/spray Nasal Spray 2 Spray(s) Both Nostrils two times a day  folic acid 1 milliGRAM(s) Oral daily  latanoprost 0.005% Ophthalmic Solution 1 Drop(s) Both EYES at bedtime  levETIRAcetam 500 milliGRAM(s) Oral two times a day  levothyroxine 112 MICROGram(s) Oral daily  melatonin 5 milliGRAM(s) Oral at bedtime  methotrexate (Non - oncologic) 10 milliGRAM(s) Oral <User Schedule>  potassium chloride  10 mEq/100 mL IVPB 10 milliEquivalent(s) IV Intermittent every 1 hour    MEDICATIONS  (PRN):  aluminum hydroxide/magnesium hydroxide/simethicone Suspension 30 milliLiter(s) Oral every 4 hours PRN Dyspepsia  ibuprofen  Tablet. 600 milliGRAM(s) Oral every 8 hours PRN Severe Pain (7 - 10)  melatonin 3 milliGRAM(s) Oral at bedtime PRN Insomnia  ondansetron Injectable 4 milliGRAM(s) IV Push every 8 hours PRN Nausea and/or Vomiting      CAPILLARY BLOOD GLUCOSE        I&O's Summary    29 Dec 2024 07:01  -  30 Dec 2024 07:00  --------------------------------------------------------  IN: 660 mL / OUT: 1000 mL / NET: -340 mL    30 Dec 2024 07:01  -  30 Dec 2024 15:05  --------------------------------------------------------  IN: 120 mL / OUT: 0 mL / NET: 120 mL        PHYSICAL EXAM:  Vital Signs Last 24 Hrs  T(C): 36.8 (30 Dec 2024 11:41), Max: 37.1 (29 Dec 2024 17:52)  T(F): 98.2 (30 Dec 2024 11:41), Max: 98.7 (29 Dec 2024 17:52)  HR: 61 (30 Dec 2024 11:41) (61 - 86)  BP: 138/83 (30 Dec 2024 11:41) (131/66 - 138/83)  BP(mean): --  RR: 18 (30 Dec 2024 11:41) (18 - 18)  SpO2: 95% (30 Dec 2024 11:41) (93% - 95%)    Parameters below as of 30 Dec 2024 11:41  Patient On (Oxygen Delivery Method): room air        CONSTITUTIONAL: NAD  HEENT: R ear with dried blood  RESPIRATORY: Normal respiratory effort; lungs are clear to auscultation bilaterally  CARDIOVASCULAR: Regular rate and rhythm, normal S1 and S2, no murmur/rub/gallop; No lower extremity edema  ABDOMEN: Nontender to palpation, normoactive bowel sounds, no rebound/guarding  MUSCLOSKELETAL: no clubbing or cyanosis of digits; no joint swelling or tenderness to palpation  PSYCH: A+O to person, place  NEURO: moving all extremities    LABS:                        10.8   8.92  )-----------( 357      ( 30 Dec 2024 10:01 )             34.2     12-30    146[H]  |  108  |  11  ----------------------------<  109[H]  3.3[L]   |  24  |  0.81    Ca    7.7[L]      30 Dec 2024 10:01  Phos  2.6     12-30  Mg     2.1     12-30    TPro  6.8  /  Alb  2.9[L]  /  TBili  0.4  /  DBili  x   /  AST  27  /  ALT  32  /  AlkPhos  275[H]  12-28    PT/INR - ( 28 Dec 2024 21:20 )   PT: 12.0 sec;   INR: 1.05 ratio         PTT - ( 28 Dec 2024 21:20 )  PTT:25.9 sec      Urinalysis Basic - ( 30 Dec 2024 10:01 )    Color: x / Appearance: x / SG: x / pH: x  Gluc: 109 mg/dL / Ketone: x  / Bili: x / Urobili: x   Blood: x / Protein: x / Nitrite: x   Leuk Esterase: x / RBC: x / WBC x   Sq Epi: x / Non Sq Epi: x / Bacteria: x        Culture - Urine (collected 28 Dec 2024 21:32)  Source: Clean Catch Clean Catch (Midstream)  Preliminary Report (29 Dec 2024 21:41):    >100,000 CFU/ml Escherichia coli    Culture - Blood (collected 28 Dec 2024 20:50)  Source: .Blood BLOOD  Preliminary Report (30 Dec 2024 01:03):    No growth at 24 hours    Culture - Blood (collected 28 Dec 2024 20:35)  Source: .Blood BLOOD  Preliminary Report (30 Dec 2024 01:03):    No growth at 24 hours

## 2024-12-30 NOTE — PROGRESS NOTE ADULT - PROBLEM SELECTOR PLAN 1
mechanical fall, chin lac s/p repair  also with possible t12 fx per rads   discussed with sister who showed records from 2023 showing T12 compression fx  takes ibuprofen at home for pain will c/w

## 2024-12-31 ENCOUNTER — TRANSCRIPTION ENCOUNTER (OUTPATIENT)
Age: 64
End: 2024-12-31

## 2024-12-31 VITALS
HEART RATE: 61 BPM | TEMPERATURE: 98 F | RESPIRATION RATE: 18 BRPM | OXYGEN SATURATION: 95 % | SYSTOLIC BLOOD PRESSURE: 126 MMHG | DIASTOLIC BLOOD PRESSURE: 73 MMHG

## 2024-12-31 LAB
ANION GAP SERPL CALC-SCNC: 10 MMOL/L — SIGNIFICANT CHANGE UP (ref 5–17)
BUN SERPL-MCNC: 13 MG/DL — SIGNIFICANT CHANGE UP (ref 7–23)
CALCIUM SERPL-MCNC: 8.1 MG/DL — LOW (ref 8.4–10.5)
CHLORIDE SERPL-SCNC: 109 MMOL/L — HIGH (ref 96–108)
CO2 SERPL-SCNC: 25 MMOL/L — SIGNIFICANT CHANGE UP (ref 22–31)
CREAT SERPL-MCNC: 0.83 MG/DL — SIGNIFICANT CHANGE UP (ref 0.5–1.3)
EGFR: 78 ML/MIN/1.73M2 — SIGNIFICANT CHANGE UP
GLUCOSE SERPL-MCNC: 116 MG/DL — HIGH (ref 70–99)
HCT VFR BLD CALC: 36 % — SIGNIFICANT CHANGE UP (ref 34.5–45)
HGB BLD-MCNC: 11.6 G/DL — SIGNIFICANT CHANGE UP (ref 11.5–15.5)
MAGNESIUM SERPL-MCNC: 2 MG/DL — SIGNIFICANT CHANGE UP (ref 1.6–2.6)
MCHC RBC-ENTMCNC: 32.2 G/DL — SIGNIFICANT CHANGE UP (ref 32–36)
MCHC RBC-ENTMCNC: 32.7 PG — SIGNIFICANT CHANGE UP (ref 27–34)
MCV RBC AUTO: 101.4 FL — HIGH (ref 80–100)
NRBC # BLD: 0 /100 WBCS — SIGNIFICANT CHANGE UP (ref 0–0)
PLATELET # BLD AUTO: 412 K/UL — HIGH (ref 150–400)
POTASSIUM SERPL-MCNC: 3.8 MMOL/L — SIGNIFICANT CHANGE UP (ref 3.5–5.3)
POTASSIUM SERPL-SCNC: 3.8 MMOL/L — SIGNIFICANT CHANGE UP (ref 3.5–5.3)
RBC # BLD: 3.55 M/UL — LOW (ref 3.8–5.2)
RBC # FLD: 15.7 % — HIGH (ref 10.3–14.5)
SODIUM SERPL-SCNC: 144 MMOL/L — SIGNIFICANT CHANGE UP (ref 135–145)
WBC # BLD: 8.56 K/UL — SIGNIFICANT CHANGE UP (ref 3.8–10.5)
WBC # FLD AUTO: 8.56 K/UL — SIGNIFICANT CHANGE UP (ref 3.8–10.5)

## 2024-12-31 PROCEDURE — 96374 THER/PROPH/DIAG INJ IV PUSH: CPT

## 2024-12-31 PROCEDURE — 76377 3D RENDER W/INTRP POSTPROCES: CPT

## 2024-12-31 PROCEDURE — 81001 URINALYSIS AUTO W/SCOPE: CPT

## 2024-12-31 PROCEDURE — 94640 AIRWAY INHALATION TREATMENT: CPT

## 2024-12-31 PROCEDURE — 83880 ASSAY OF NATRIURETIC PEPTIDE: CPT

## 2024-12-31 PROCEDURE — 96375 TX/PRO/DX INJ NEW DRUG ADDON: CPT

## 2024-12-31 PROCEDURE — 70450 CT HEAD/BRAIN W/O DYE: CPT | Mod: MC

## 2024-12-31 PROCEDURE — 71250 CT THORAX DX C-: CPT | Mod: MC

## 2024-12-31 PROCEDURE — 87086 URINE CULTURE/COLONY COUNT: CPT

## 2024-12-31 PROCEDURE — 80048 BASIC METABOLIC PNL TOTAL CA: CPT

## 2024-12-31 PROCEDURE — 83605 ASSAY OF LACTIC ACID: CPT

## 2024-12-31 PROCEDURE — 83735 ASSAY OF MAGNESIUM: CPT

## 2024-12-31 PROCEDURE — 84100 ASSAY OF PHOSPHORUS: CPT

## 2024-12-31 PROCEDURE — 85027 COMPLETE CBC AUTOMATED: CPT

## 2024-12-31 PROCEDURE — 87040 BLOOD CULTURE FOR BACTERIA: CPT

## 2024-12-31 PROCEDURE — 99239 HOSP IP/OBS DSCHRG MGMT >30: CPT

## 2024-12-31 PROCEDURE — 96376 TX/PRO/DX INJ SAME DRUG ADON: CPT

## 2024-12-31 PROCEDURE — 71045 X-RAY EXAM CHEST 1 VIEW: CPT

## 2024-12-31 PROCEDURE — 97161 PT EVAL LOW COMPLEX 20 MIN: CPT

## 2024-12-31 PROCEDURE — 87186 SC STD MICRODIL/AGAR DIL: CPT

## 2024-12-31 PROCEDURE — 70486 CT MAXILLOFACIAL W/O DYE: CPT | Mod: MC

## 2024-12-31 PROCEDURE — 99285 EMERGENCY DEPT VISIT HI MDM: CPT

## 2024-12-31 PROCEDURE — 84484 ASSAY OF TROPONIN QUANT: CPT

## 2024-12-31 RX ORDER — PREDNISONE 5 MG
1 TABLET ORAL
Refills: 0 | DISCHARGE

## 2024-12-31 RX ORDER — FLUTICASONE PROPIONATE 50 UG/1
2 SPRAY, METERED NASAL
Qty: 0 | Refills: 0 | DISCHARGE

## 2024-12-31 RX ADMIN — LEVETIRACETAM 500 MILLIGRAM(S): 100 SOLUTION ORAL at 05:35

## 2024-12-31 RX ADMIN — FAMOTIDINE 20 MILLIGRAM(S): 20 TABLET, FILM COATED ORAL at 11:27

## 2024-12-31 RX ADMIN — LEVOTHYROXINE SODIUM 112 MICROGRAM(S): 175 TABLET ORAL at 05:35

## 2024-12-31 RX ADMIN — FLUTICASONE PROPIONATE 2 SPRAY(S): 50 SPRAY, METERED NASAL at 05:35

## 2024-12-31 RX ADMIN — Medication 5 DROP(S): at 05:36

## 2024-12-31 RX ADMIN — CETIRIZINE HYDROCHLORIDE 10 MILLIGRAM(S): 5 SYRUP ORAL at 11:27

## 2024-12-31 RX ADMIN — Medication 1 MILLIGRAM(S): at 11:27

## 2024-12-31 NOTE — DISCHARGE NOTE PROVIDER - NSDCMRMEDTOKEN_GEN_ALL_CORE_FT
Calcium 600mg + Vitamin D3 800 IU tablet: 1 tablet orally 2 times a day  cetirizine 10 mg oral tablet: 1 tab(s) orally once a day  clonazePAM 0.5 mg oral tablet: 1 tab(s) orally once a day  donepezil 10 mg oral tablet: 1 tab(s) orally once a day (at bedtime)  famotidine 20 mg oral tablet: 1 tab(s) orally once a day  fluticasone 50 mcg/inh nasal spray: 2 spray(s) in each nostril 2 times a day  folic acid 1 mg oral tablet: 1 tab(s) orally once a day  ibuprofen 200 mg oral tablet: 2 tab(s) orally every 8 hours as needed  levETIRAcetam 500 mg oral tablet: 1 tab(s) orally 2 times a day (in the morning and at bedtime)  levothyroxine 112 mcg (0.112 mg) oral tablet: 1 tab(s) orally once a day  Lumigan 0.01% ophthalmic solution: 1 drop(s) in each affected eye once a day (at bedtime)  Melatonin 5 mg oral tablet: 1 tab(s) orally once a day (in the evening)  methotrexate 2.5 mg oral tablet: 4 tab(s) orally once a week (every Thursday)  Multivitamin (Tab-A-Rachel) tablet: 1 tablet orally once a day  Marcia-Bid 1 Billion CFU&#x27;s Caplet: 1 tablet orally 2 times a day

## 2024-12-31 NOTE — PHYSICAL THERAPY INITIAL EVALUATION ADULT - IMPAIRMENTS CONTRIBUTING IMPAIRED BED MOBILITY, REHAB EVAL
impaired postural control/decreased strength impaired balance/impaired motor control/impaired postural control/decreased strength

## 2024-12-31 NOTE — DISCHARGE NOTE NURSING/CASE MANAGEMENT/SOCIAL WORK - PATIENT PORTAL LINK FT
You can access the FollowMyHealth Patient Portal offered by [facility] by registering at the following website: [email or web address]. By joining [facility]’s FollowMyHealth portal, you will also be able to view your health information using other applications (apps) compatible with our system.

## 2024-12-31 NOTE — DISCHARGE NOTE NURSING/CASE MANAGEMENT/SOCIAL WORK - NSDCFUADDAPPT_GEN_ALL_CORE_FT
APPTS ARE READY TO BE MADE: [x] YES    Best Family or Patient Contact (if needed):    Additional Information about above appointments (if needed):    1: Primary care in 1 week  2: Cardiology - appt 1/28/25  3:     Other comments or requests:

## 2024-12-31 NOTE — PHYSICAL THERAPY INITIAL EVALUATION ADULT - RANGE OF MOTION EXAMINATION, REHAB EVAL
sister reports pt has OA bilateral shoulders, at baseline has limited shoulder flex AROM/bilateral upper extremity ROM was WFL (within functional limits)/bilateral lower extremity ROM was WFL (within functional limits)

## 2024-12-31 NOTE — DISCHARGE NOTE PROVIDER - ATTENDING DISCHARGE PHYSICAL EXAMINATION:
PHYSICAL EXAM:    Vital Signs Last 24 Hrs  T(C): 36.7 (31 Dec 2024 08:08), Max: 37 (30 Dec 2024 21:34)  T(F): 98.1 (31 Dec 2024 08:08), Max: 98.6 (30 Dec 2024 21:34)  HR: 61 (31 Dec 2024 08:08) (61 - 72)  BP: 126/73 (31 Dec 2024 08:08) (126/73 - 153/87)  BP(mean): --  RR: 18 (31 Dec 2024 08:08) (18 - 18)  SpO2: 95% (31 Dec 2024 08:08) (93% - 95%)    General: No acute distress.  HEENT: No scleral icterus or injection.   Neck: Supple.  Full ROM.  No JVD  Heart: RRR.  Normal S1 and S2.  No murmurs, rubs, or gallops.   Lungs: CTAB. No wheezes, crackles, or rhonchi.    Abdomen: BS+, soft, NT/ND  Skin: Warm and dry.  No rashes.  Extremities: No edema, clubbing, or cyanosis  Musculoskeletal: No deformities  Neuro: A&Ox3. Moving all extremities

## 2024-12-31 NOTE — DISCHARGE NOTE PROVIDER - HOSPITAL COURSE
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  Patient had mechanical fall, was getting dressed and was pulling up diaper, fell forward striking chin and lip.  No LOC, dizziness. Sister, Sumi at bedside.  (29 Dec 2024 12:56)    Hospital Course:  63 yo F with hx of Down syndrome, RA on MTX. hypothyroidism p/w mechanical fall in the setting of UTI and possible T12 fx        Problem/Plan - 1:  ·  Problem: Fall.   ·  Plan: mechanical fall, chin lac s/p repair  also with possible t12 fx per rads   discussed with sister who showed records from 2023 showing T12 compression fx  takes ibuprofen at home for pain will c/w.     Problem/Plan - 2:  ·  Problem: Acute UTI.   ·  Plan: unable to provide ROS due to dementia, U/A grossly positive but sample with epi cells, given fevers will treat  c/w IV Ceftriaxone 1gm daily x 3 days  UCx growing Ecoli.     Problem/Plan - 3:  ·  Problem: Rheumatoid arthritis.   ·  Plan: c/w home MTX, prednisone prn.     Problem/Plan - 4:  ·  Problem: Hypothyroidism.   ·  Plan: c/w synthroid 112mcg daily.     Problem/Plan - 5:  ·  Problem: Seizure disorder, primary.   ·  Plan: had one seizure 20 years ago per sister  c/w keppra 500mg BID.    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  Patient had mechanical fall, was getting dressed and was pulling up diaper, fell forward striking chin and lip.  No LOC, dizziness. Sister, Sumi at bedside.  (29 Dec 2024 12:56)    Hospital Course:  63 yo F with hx of Down syndrome, RA on MTX. hypothyroidism p/w mechanical fall in the setting of UTI and possible T12 fx        Problem/Plan - 1:  ·  Problem: Fall.   ·  Plan: mechanical fall, chin lac s/p repair  also with possible t12 fx per rads   discussed with sister who showed records from 2023 showing T12 compression fx  takes ibuprofen at home for pain will c/w.     Problem/Plan - 2:  ·  Problem: Acute UTI.   ·  Plan: unable to provide ROS due to dementia, U/A grossly positive but sample with epi cells, given fevers will treat  c/w IV Ceftriaxone 1gm daily x 3 days  UCx growing Ecoli.     Problem/Plan - 3:  ·  Problem: Rheumatoid arthritis.   ·  Plan: c/w home MTX, prednisone prn.     Problem/Plan - 4:  ·  Problem: Hypothyroidism.   ·  Plan: c/w synthroid 112mcg daily.     Problem/Plan - 5:  ·  Problem: Seizure disorder, primary.   ·  Plan: had one seizure 20 years ago per sister  c/w keppra 500mg BID.    Advanced Directives:   [x] Full code  [ ] DNR  [ ] Hospice    Discharge Diagnoses:  (Admit Diagnosis) Unspecified fall, initial encounter    No New Medications

## 2024-12-31 NOTE — DISCHARGE NOTE NURSING/CASE MANAGEMENT/SOCIAL WORK - NSFLUVACAGEDISCH_IMM_ALL_CORE
PATIENT DOES WANT THE PRILOSEC TO BE CALLED IN PLEASE. Rx Refill Note  Requested Prescriptions     Pending Prescriptions Disp Refills    omeprazole (priLOSEC) 20 MG capsule 30 capsule 11     Sig: Take 1 capsule by mouth Daily.      Last office visit with prescribing clinician: 11/30/2023   Last telemedicine visit with prescribing clinician: Visit date not found   Next office visit with prescribing clinician: 4/19/2024                         Would you like a call back once the refill request has been completed: [] Yes [] No    If the office needs to give you a call back, can they leave a voicemail: [] Yes [] No    Jose Ramires MA  02/02/24, 16:02 CST   Adult

## 2024-12-31 NOTE — PHYSICAL THERAPY INITIAL EVALUATION ADULT - ADDITIONAL COMMENTS
Per sister at bedside, pt ambulates using rollator, 1 assist for ADLs at group home, has assistance if needed. Pt also owns rolling walker & wheelchair, does not use wheelchair often.

## 2024-12-31 NOTE — PHYSICAL THERAPY INITIAL EVALUATION ADULT - AMBULATION SKILLS, REHAB EVAL
independent/needs device Ketoconazole Counseling:   Patient counseled regarding improving absorption with orange juice.  Adverse effects include but are not limited to breast enlargement, headache, diarrhea, nausea, upset stomach, liver function test abnormalities, taste disturbance, and stomach pain.  There is a rare possibility of liver failure that can occur when taking ketoconazole. The patient understands that monitoring of LFTs may be required, especially at baseline. The patient verbalized understanding of the proper use and possible adverse effects of ketoconazole.  All of the patient's questions and concerns were addressed.

## 2024-12-31 NOTE — DISCHARGE NOTE PROVIDER - CARE PROVIDER_API CALL
SABINA ANTON  Follow Up Time: 1 week    Ilan Bowen  Cardiovascular Disease  [address] [place] [address]  Phone: [phone]  Fax: [phone]  Follow Up Time:

## 2024-12-31 NOTE — DISCHARGE NOTE PROVIDER - NSDCFUSCHEDAPPT_GEN_ALL_CORE_FT
Ilan Bowen Physician [facility]  CARDIOLOGY [address] [place] [address]  Scheduled Appointment: 01/28/2025

## 2024-12-31 NOTE — DISCHARGE NOTE PROVIDER - CARE PROVIDERS DIRECT ADDRESSES
,DirectAddress_Unknown,[email or web address]

## 2024-12-31 NOTE — DISCHARGE NOTE NURSING/CASE MANAGEMENT/SOCIAL WORK - NSDCVIVACCINE_GEN_ALL_CORE_FT
Tdap; 29-Sep-2021 22:03; Leonela BAEZ (RN); [employer]; [id number] (Exp. Date: 14-Apr-2023); IntraMuscular; Deltoid Right.; 0.5 milliLiter(s); VIS (VIS Published: 09-May-2013, VIS Presented: 29-Sep-2021);   Tdap; 12-Sep-2024 08:32; Amirah Solis (OSCAR); [employer]; [id number] (Exp. Date: 12-Sep-2024); IntraMuscular; Deltoid Right.; 0.5 milliLiter(s); VIS (VIS Published: 09-May-2013, VIS Presented: 12-Sep-2024);

## 2024-12-31 NOTE — DISCHARGE NOTE PROVIDER - NSDCCPTREATMENT_GEN_ALL_CORE_FT
PRINCIPAL PROCEDURE  Procedure: CT chest wo con  Findings and Treatment: PROCEDURE:  CT of the Chest was performed.  Sagittal and coronal reformats were performed.  FINDINGS: Motion artifact degrades image quality and limits evaluation.  LUNGS AND LARGE AIRWAYS: Patent central airways. Hypoventilation.   Bilateral lower lobe, right middle lobe, and lingular atelectatic changes.  PLEURA: Small bilateral pleural effusions, predominantly subpulmonic on   the length, with adjacent compressive atelectasis.  VESSELS: Main pulmonary artery measures 4 cm in diameter suggesting   pulmonary hypertension.  HEART: Heart size is normal. No pericardial effusion.  MEDIASTINUM AND OSMIN: No significant lymphadenopathy.  CHEST WALL AND LOWER NECK: Within normal limits.  VISUALIZED UPPER ABDOMEN: Right-sided intrathoracic kidney with   associated Bochdalek hernia versus congenital diaphragmatic hernia. There   is associated protrusion of the posterior aspect of the liver into the   thoracic cavity. No hydronephrosis. Incompletely visualized ureteric   course.  BONES: Degenerative changes. Age-indeterminate compression fracture of   the T12 vertebral body with anterior wedging.  IMPRESSION:  Small bilateral pleural effusions, predominantly subpulmonic on the   length, with adjacent compressive atelectasis. Correlate clinically for   superimposed infection.  Right-sided Bochdalek hernia versus congenital diaphragmatic hernia   hernia with focal herniation of the posterior liver and right-sided   intrathoracic kidney, likely chronic.  Age-indeterminate compression deformity of the T12 vertebral body with   anterior wedging. MRI recommended for further evaluation, as clinically   warranted.  --- End of Report ---

## 2024-12-31 NOTE — PHYSICAL THERAPY INITIAL EVALUATION ADULT - GENERAL OBSERVATIONS, REHAB EVAL
Pt a/w fall, CT chest showing T12 age indeterminate compression fx, per speaking with NP Marianne Cespedes, pt's sister brought in imaging records from 2023 showing T12 compression fx, pt will not get MRI or any further consults, PT cleared to work with pt. CTH (-) for acute injury. Pt has h/o down syndrome, onset of dementia per sister. Pt received semi-supine in bed in NAD, A&Ox2, following all commands, agreeable to PT, sister at bedside during evaluation. Pt a/w fall, CT chest showing T12 age indeterminate compression fx, per speaking with ZANDER Cespedes, pt's sister brought in imaging records from 2023 showing T12 compression fx, pt will not get MRI or any further consults, PT cleared to work with pt. CTH (-) for acute injury. Pt has h/o down syndrome, onset of dementia per sister. Pt received semi-supine in bed in NAD, A&Ox2, following all commands, agreeable to PT, sister at bedside during evaluation.

## 2024-12-31 NOTE — PHYSICAL THERAPY INITIAL EVALUATION ADULT - PATIENT/FAMILY AGREES WITH PLAN
PT spoke with sister Sumi who is in agreement with discharge back to group home with PT services/yes

## 2024-12-31 NOTE — DISCHARGE NOTE PROVIDER - NSDCCPCAREPLAN_GEN_ALL_CORE_FT
PRINCIPAL DISCHARGE DIAGNOSIS  Diagnosis: Fall  Assessment and Plan of Treatment: Mechanical fall, chin laceration s/p repair on 12/29.  Follow up with primary care outpatient for suture removal in 1 week.   CT head negative.  CT maxillofacial negative.   Ibuprofen as needed for pain.         SECONDARY DISCHARGE DIAGNOSES  Diagnosis: Acute UTI  Assessment and Plan of Treatment: You were given IV antibiotics in the hospital.     PRINCIPAL DISCHARGE DIAGNOSIS  Diagnosis: Fall  Assessment and Plan of Treatment: Mechanical fall, chin laceration s/p repair on 12/29.  Follow up with primary care outpatient for suture removal in 1 week.   CT head negative.  CT maxillofacial negative.   Ibuprofen as needed for pain.         SECONDARY DISCHARGE DIAGNOSES  Diagnosis: Acute UTI  Assessment and Plan of Treatment: You were given IV antibiotics in the hospital.    Diagnosis: Compression fracture of thoracic vertebra  Assessment and Plan of Treatment: CT showed T12 compression fracture which is chronic (seen on previous imaging). Please follow up with your primary care doctor

## 2024-12-31 NOTE — PHYSICAL THERAPY INITIAL EVALUATION ADULT - ASR WT BEARING STATUS EVAL
no weight-bearing restrictions NP Marianne Cespedes cleared PT to work with pt/no weight-bearing restrictions ZANDER Cespedes cleared PT to work with pt/no weight-bearing restrictions

## 2024-12-31 NOTE — PHYSICAL THERAPY INITIAL EVALUATION ADULT - IMPAIRMENTS FOUND, PT EVAL
aerobic capacity/endurance/decreased midline orientation/fine motor/gait, locomotion, and balance/gross motor/muscle strength/poor safety awareness/ROM

## 2024-12-31 NOTE — PHYSICAL THERAPY INITIAL EVALUATION ADULT - NSPTDISCHREC_GEN_A_CORE
return to group home with physical therapy services, assistance for mobility & ADLs. PT recommends pt use rolling walker for all mobility. Sister Sumi aware & verbalized agreement/Home PT

## 2024-12-31 NOTE — DISCHARGE NOTE PROVIDER - NSDCFUADDAPPT_GEN_ALL_CORE_FT
APPTS ARE READY TO BE MADE: [x] YES    Best Family or Patient Contact (if needed):    Additional Information about above appointments (if needed):    1: Primary care in 1 week  2: Cardiology - appt 1/28/25  3:     Other comments or requests:    APPTS ARE READY TO BE MADE: [x] YES    Best Family or Patient Contact (if needed):    Additional Information about above appointments (if needed):    1: Primary care in 1 week  2: Cardiology - appt 1/28/25  3:     Other comments or requests:   Patient was outreached, however they advised they were readmitted to a hospital.

## 2025-01-03 LAB
CULTURE RESULTS: SIGNIFICANT CHANGE UP
CULTURE RESULTS: SIGNIFICANT CHANGE UP
SPECIMEN SOURCE: SIGNIFICANT CHANGE UP
SPECIMEN SOURCE: SIGNIFICANT CHANGE UP

## 2025-01-09 ENCOUNTER — NON-APPOINTMENT (OUTPATIENT)
Age: 65
End: 2025-01-09

## 2025-01-28 ENCOUNTER — APPOINTMENT (OUTPATIENT)
Dept: CARDIOLOGY | Facility: CLINIC | Age: 65
End: 2025-01-28